# Patient Record
Sex: FEMALE | Race: WHITE | NOT HISPANIC OR LATINO | Employment: FULL TIME | URBAN - METROPOLITAN AREA
[De-identification: names, ages, dates, MRNs, and addresses within clinical notes are randomized per-mention and may not be internally consistent; named-entity substitution may affect disease eponyms.]

---

## 2021-04-26 ENCOUNTER — TRANSFERRED RECORDS (OUTPATIENT)
Dept: HEALTH INFORMATION MANAGEMENT | Facility: CLINIC | Age: 56
End: 2021-04-26

## 2021-05-03 ENCOUNTER — TRANSCRIBE ORDERS (OUTPATIENT)
Dept: OTHER | Age: 56
End: 2021-05-03

## 2021-05-03 DIAGNOSIS — N18.32 STAGE 3B CHRONIC KIDNEY DISEASE (H): Primary | ICD-10-CM

## 2021-06-27 ENCOUNTER — HEALTH MAINTENANCE LETTER (OUTPATIENT)
Age: 56
End: 2021-06-27

## 2021-08-11 NOTE — TELEPHONE ENCOUNTER
RECORDS RECEIVED FROM:    DATE RECEIVED: 09.20.2021   NOTES STATUS DETAILS   OFFICE NOTE from referring provider N/A    OFFICE NOTE from other specialist  Received 09.15.2021 Dr. Mike Varela at [facility]    *Only VASCULITIS or LUPUS gather office notes for the following N/A    *PULMONARY   N/A    *ENT N/A    *DERMATOLOGY N/A    *RHEUMATOLOGY N/A    DISCHARGE SUMMARY from hospital N/A    DISCHARGE REPORT from the ER N/A    MEDICATION LIST Care Everywhere    IMAGING  (NEED IMAGES AND REPORTS)     KIDNEY CT SCAN N/A    KIDNEY ULTRASOUND N/A    MR ABDOMEN N/A    NUCLEAR MEDICINE RENAL N/A    LABS     CBC Care Everywhere 02.13.217   CMP Care Everywhere 02.13.2017   BMP Care Everywhere 02.13.2017   UA Care Everywhere 05.13.2021   URINE PROTEIN Care Everywhere 05.13.2021   RENAL PANEL Care Everywhere 04.27.2021   BIOPSY     KIDNEY BIOPSY  N/A       Action 09.15.2021 RM   Action Taken Called [facility] to get referral and records sent over called [phone] spoke to a rep who states she will be faxing over referral.     Action 09.17.2021 RM   Action Taken Records received, same records are in care everywhere.

## 2021-09-20 ENCOUNTER — PRE VISIT (OUTPATIENT)
Dept: NEPHROLOGY | Facility: CLINIC | Age: 56
End: 2021-09-20

## 2021-09-21 DIAGNOSIS — N18.30 CKD (CHRONIC KIDNEY DISEASE) STAGE 3, GFR 30-59 ML/MIN (H): ICD-10-CM

## 2021-09-21 DIAGNOSIS — R79.89 ELEVATED SERUM CREATININE: Primary | ICD-10-CM

## 2021-09-28 ENCOUNTER — LAB (OUTPATIENT)
Dept: LAB | Facility: CLINIC | Age: 56
End: 2021-09-28
Payer: COMMERCIAL

## 2021-09-28 DIAGNOSIS — N18.30 CKD (CHRONIC KIDNEY DISEASE) STAGE 3, GFR 30-59 ML/MIN (H): ICD-10-CM

## 2021-09-28 DIAGNOSIS — R73.9 HYPERGLYCEMIA: ICD-10-CM

## 2021-09-28 DIAGNOSIS — R79.89 ELEVATED SERUM CREATININE: ICD-10-CM

## 2021-09-28 LAB
ALBUMIN UR-MCNC: NEGATIVE MG/DL
APPEARANCE UR: CLEAR
BILIRUB UR QL STRIP: NEGATIVE
COLOR UR AUTO: YELLOW
ERYTHROCYTE [DISTWIDTH] IN BLOOD BY AUTOMATED COUNT: 12.1 % (ref 10–15)
GLUCOSE UR STRIP-MCNC: NEGATIVE MG/DL
HCT VFR BLD AUTO: 40 % (ref 35–47)
HGB BLD-MCNC: 13.2 G/DL (ref 11.7–15.7)
HGB UR QL STRIP: ABNORMAL
KETONES UR STRIP-MCNC: NEGATIVE MG/DL
LEUKOCYTE ESTERASE UR QL STRIP: ABNORMAL
MCH RBC QN AUTO: 29.7 PG (ref 26.5–33)
MCHC RBC AUTO-ENTMCNC: 33 G/DL (ref 31.5–36.5)
MCV RBC AUTO: 90 FL (ref 78–100)
NITRATE UR QL: NEGATIVE
PH UR STRIP: 6 [PH] (ref 5–7)
PLATELET # BLD AUTO: 253 10E3/UL (ref 150–450)
RBC # BLD AUTO: 4.44 10E6/UL (ref 3.8–5.2)
RBC #/AREA URNS AUTO: ABNORMAL /HPF
SP GR UR STRIP: 1.01 (ref 1–1.03)
SQUAMOUS #/AREA URNS AUTO: ABNORMAL /LPF
UROBILINOGEN UR STRIP-ACNC: 0.2 E.U./DL
WBC # BLD AUTO: 8.9 10E3/UL (ref 4–11)
WBC #/AREA URNS AUTO: ABNORMAL /HPF

## 2021-09-28 PROCEDURE — 83036 HEMOGLOBIN GLYCOSYLATED A1C: CPT

## 2021-09-28 PROCEDURE — 36415 COLL VENOUS BLD VENIPUNCTURE: CPT

## 2021-09-28 PROCEDURE — 84165 PROTEIN E-PHORESIS SERUM: CPT

## 2021-09-28 PROCEDURE — 83883 ASSAY NEPHELOMETRY NOT SPEC: CPT

## 2021-09-28 PROCEDURE — 81001 URINALYSIS AUTO W/SCOPE: CPT

## 2021-09-28 PROCEDURE — 85027 COMPLETE CBC AUTOMATED: CPT

## 2021-09-28 PROCEDURE — 83970 ASSAY OF PARATHORMONE: CPT

## 2021-09-28 PROCEDURE — 80069 RENAL FUNCTION PANEL: CPT

## 2021-09-28 PROCEDURE — 84155 ASSAY OF PROTEIN SERUM: CPT

## 2021-09-28 PROCEDURE — 83550 IRON BINDING TEST: CPT

## 2021-09-28 PROCEDURE — 82728 ASSAY OF FERRITIN: CPT

## 2021-09-28 PROCEDURE — 84156 ASSAY OF PROTEIN URINE: CPT

## 2021-09-28 PROCEDURE — 82306 VITAMIN D 25 HYDROXY: CPT

## 2021-09-28 NOTE — TELEPHONE ENCOUNTER
RECORDS RECEIVED FROM: Care Everywhere   DATE RECEIVED: 09.29.2021   NOTES STATUS DETAILS   OFFICE NOTE from referring provider N/A    OFFICE NOTE from other specialist  Care Everywhere 09.15.2021 Dr. Mike Varela at [facility]    *Only VASCULITIS or LUPUS gather office notes for the following N/A    *PULMONARY   N/A    *ENT N/A    *DERMATOLOGY N/A    *RHEUMATOLOGY N/A    DISCHARGE SUMMARY from hospital N/A    DISCHARGE REPORT from the ER N/A    MEDICATION LIST Care Everywhere    IMAGING  (NEED IMAGES AND REPORTS)     KIDNEY CT SCAN N/A    KIDNEY ULTRASOUND N/A    MR ABDOMEN N/A    NUCLEAR MEDICINE RENAL N/A    LABS     CBC Care Everywhere 02.13.2017   CMP Care Everywhere 02.13.2017   BMP Care Everywhere 02.13.2017   UA Care Everywhere 05.13.2017   URINE PROTEIN Care Everywhere 05.13.2017   RENAL PANEL Care Everywhere 04.27.2021   BIOPSY     KIDNEY BIOPSY  N/A

## 2021-09-29 ENCOUNTER — PRE VISIT (OUTPATIENT)
Dept: NEPHROLOGY | Facility: CLINIC | Age: 56
End: 2021-09-29

## 2021-09-29 ENCOUNTER — OFFICE VISIT (OUTPATIENT)
Dept: NEPHROLOGY | Facility: CLINIC | Age: 56
End: 2021-09-29
Attending: INTERNAL MEDICINE
Payer: COMMERCIAL

## 2021-09-29 VITALS
HEART RATE: 93 BPM | OXYGEN SATURATION: 100 % | DIASTOLIC BLOOD PRESSURE: 82 MMHG | SYSTOLIC BLOOD PRESSURE: 133 MMHG | WEIGHT: 147.6 LBS

## 2021-09-29 DIAGNOSIS — M81.0 AGE-RELATED OSTEOPOROSIS WITHOUT CURRENT PATHOLOGICAL FRACTURE: ICD-10-CM

## 2021-09-29 DIAGNOSIS — R79.89 ELEVATED SERUM CREATININE: ICD-10-CM

## 2021-09-29 DIAGNOSIS — R73.9 HYPERGLYCEMIA: Primary | ICD-10-CM

## 2021-09-29 DIAGNOSIS — N18.32 STAGE 3B CHRONIC KIDNEY DISEASE (H): ICD-10-CM

## 2021-09-29 LAB
ALBUMIN SERPL-MCNC: 3.8 G/DL (ref 3.4–5)
ANION GAP SERPL CALCULATED.3IONS-SCNC: 1 MMOL/L (ref 3–14)
BUN SERPL-MCNC: 22 MG/DL (ref 7–30)
CALCIUM SERPL-MCNC: 9 MG/DL (ref 8.5–10.1)
CHLORIDE BLD-SCNC: 103 MMOL/L (ref 94–109)
CO2 SERPL-SCNC: 31 MMOL/L (ref 20–32)
CREAT SERPL-MCNC: 1.34 MG/DL (ref 0.52–1.04)
CREAT UR-MCNC: 40 MG/DL
DEPRECATED CALCIDIOL+CALCIFEROL SERPL-MC: 97 UG/L (ref 20–75)
FERRITIN SERPL-MCNC: 85 NG/ML (ref 8–252)
GFR SERPL CREATININE-BSD FRML MDRD: 45 ML/MIN/1.73M2
GLUCOSE BLD-MCNC: 191 MG/DL (ref 70–99)
HBA1C MFR BLD: 5.1 % (ref 0–5.6)
IRON SATN MFR SERPL: 22 % (ref 15–46)
IRON SERPL-MCNC: 74 UG/DL (ref 35–180)
PHOSPHATE SERPL-MCNC: 3 MG/DL (ref 2.5–4.5)
POTASSIUM BLD-SCNC: 4.4 MMOL/L (ref 3.4–5.3)
PROT UR-MCNC: <0.05 G/L
PROT/CREAT 24H UR: NORMAL MG/G{CREAT}
PTH-INTACT SERPL-MCNC: 34 PG/ML (ref 18–80)
SODIUM SERPL-SCNC: 135 MMOL/L (ref 133–144)
TIBC SERPL-MCNC: 340 UG/DL (ref 240–430)

## 2021-09-29 PROCEDURE — 99244 OFF/OP CNSLTJ NEW/EST MOD 40: CPT | Mod: 95 | Performed by: INTERNAL MEDICINE

## 2021-09-29 RX ORDER — ESTRADIOL AND NORETHINDRONE ACETATE 1; .5 MG/1; MG/1
1 TABLET ORAL
COMMUNITY
Start: 2021-07-20

## 2021-09-29 NOTE — LETTER
9/29/2021       RE: Whitney Maciel  [address] [place] [facility] [address]     Dear Colleague,    Thank you for referring your patient, Whitney Maciel, to the [facility] NEPHROLOGY CLINIC [place] at [facility]. Please see a copy of my visit note below.    [facility] Nephrology  09/29/2021     Assessment and Plan:    # CKD 3b:  etiology unclear at this point.  Creatinine 1.4 mg/dL with eGFR 42 on 4/27/2021 ([facility] Records)     No anemia  No albuminuria on dipstick  Urine protein pending  Will check renal ultrasound and SPEP/light chain ratio but this is unlikely to be multiple myeloma - lower likelyhood of MGRS  Given low SG on many UA, I suspect tubulointerstitial disease, possibly related to history of PPI +/- NSAID    # Hyperglycenia - check hgb A1C  # osteoporosis - on Calcvium and Vitamin D     Assessment and plan was discussed with patient and she voiced her understanding and agreement.    Tegan He MD  [facility]  Department of Medicine  Division of Renal Disease and Hypertension  [facility]  [employer] Web Console    Return in about 4 months (around 1/29/2022).     Consult:  Whitney Maciel was seen in consultation at the request of Dr. Mike Varela  for CKD management.    Reason for Visit:  Whitney Maciel is a 55 year old female with CKD from unknown cause, who presents for opinion.    HPI:  Whitney Maciel is a 55 year old female with CKD3 based on elevated creatinine with eGFR 45 since Jan 2019.   Had normal creatiine in 2017. No 2018 labs for my review.    24 hour urine calcium 143 Nov 19th 2017. This was done for evaluation of low bone density - osteopenia dx in 2008 then osteoporosis dx 2017. Follows with [facility].    Renal History:   Kidney Disease and Medical Hx:  No HTN, no diabetes, no herbal supplements  Only takes calcium or vitamin D    Was taking more advil - usually advil PM to  sleep - but now quite taking  Took prilosec in the past    No family history of kidney disease  Had one episide of bad pyelo with fevers in 2000  Normal kidney function in 2017 and 2008    ROS:   A comprehensive review of systems was obtained and negative, except as noted in the HPI or PMH.    Active Medical Problems:  CKD 3b  Osteoporosis    PMH:   Medical record was reviewed and PMH was discussed with patient and noted below.  Osteoporosis    PSH:   No past surgical history on file.    Family Hx:   No kidney disease  Personal Hx:   Social History     Tobacco Use     Smoking status: Not on file   Substance Use Topics     Alcohol use: Not on file       Allergies:  Not on File    Medications:  No current outpatient medications on file.     No current facility-administered medications for this visit.      Vitals:  There were no vitals taken for this visit.    Exam:  GENERAL APPEARANCE: alert and no distress  Pulmonary: normal work of breathing  NEURO: mentation intact and speech normal     LABS:   CMP  No lab results found.  No lab results found.  CBC  Recent Labs   Lab Test 09/28/21  1807   HGB 13.2   WBC 8.9   RBC 4.44   HCT 40.0   MCV 90   MCH 29.7   MCHC 33.0   RDW 12.1        URINE STUDIES  Recent Labs   Lab Test 09/28/21  1806   COLOR Yellow   APPEARANCE Clear   URINEGLC Negative   URINEBILI Negative   URINEKETONE Negative   SG 1.010   UBLD Trace*   URINEPH 6.0   PROTEIN Negative   UROBILINOGEN 0.2   NITRITE Negative   LEUKEST Trace*   RBCU 2-5*   WBCU 0-5     No lab results found.  PTH  No lab results found.  IRON STUDIES  No lab results found.      Tegan He MD    Seen via billable video visit. Patient at clinic, I was off campus. Doximity. 12:33pm-1:06 pm      Again, thank you for allowing me to participate in the care of your patient.      Sincerely,    Tegan He MD

## 2021-09-29 NOTE — PATIENT INSTRUCTIONS
As discussed, I will let you know on mychart when I see the results for multiple myeloma, diabetes and kidney ultrasound.    Return in about 4 months (around 1/29/2022).     Tegan He MD  [facility]  Department of Medicine  Division of Renal Disease and Hypertension

## 2021-09-29 NOTE — PROGRESS NOTES
[facility] Nephrology  09/29/2021     Assessment and Plan:    # CKD 3b:  etiology unclear at this point.  Creatinine 1.4 mg/dL with eGFR 42 on 4/27/2021 ([facility] Records)     No anemia  No albuminuria on dipstick  Urine protein pending  Will check renal ultrasound and SPEP/light chain ratio but this is unlikely to be multiple myeloma - lower likelyhood of MGRS  Given low SG on many UA, I suspect tubulointerstitial disease, possibly related to history of PPI +/- NSAID    # Hyperglycenia - check hgb A1C  # osteoporosis - on Calcvium and Vitamin D     Assessment and plan was discussed with patient and she voiced her understanding and agreement.    Tegan He MD  [facility]  Department of Medicine  Division of Renal Disease and Hypertension  [facility]  [email or web address]  [employer] Web Console    Return in about 4 months (around 1/29/2022).     Consult:  Whitney Maciel was seen in consultation at the request of Dr. Mike Varela  for CKD management.    Reason for Visit:  Whitney Maciel is a 55 year old female with CKD from unknown cause, who presents for opinion.    HPI:  Whitney Maciel is a 55 year old female with CKD3 based on elevated creatinine with eGFR 45 since Jan 2019.   Had normal creatiine in 2017. No 2018 labs for my review.    24 hour urine calcium 143 Nov 19th 2017. This was done for evaluation of low bone density - osteopenia dx in 2008 then osteoporosis dx 2017. Follows with Dr. Varela, [facility].    Renal History:   Kidney Disease and Medical Hx:  No HTN, no diabetes, no herbal supplements  Only takes calcium or vitamin D    Was taking more advil - usually advil PM to sleep - but now quite taking  Took prilosec in the past    No family history of kidney disease  Had one episide of bad pyelo with fevers in 2000  Normal kidney function in 2017 and 2008    ROS:   A comprehensive review of systems was obtained and negative, except as noted in the HPI or PMH.    Active Medical  Problems:  CKD 3b  Osteoporosis    PMH:   Medical record was reviewed and PMH was discussed with patient and noted below.  Osteoporosis    PSH:   No past surgical history on file.    Family Hx:   No kidney disease  Personal Hx:   Social History     Tobacco Use     Smoking status: Not on file   Substance Use Topics     Alcohol use: Not on file       Allergies:  Not on File    Medications:  No current outpatient medications on file.     No current facility-administered medications for this visit.      Vitals:  There were no vitals taken for this visit.    Exam:  GENERAL APPEARANCE: alert and no distress  Pulmonary: normal work of breathing  NEURO: mentation intact and speech normal     LABS:   CMP  No lab results found.  No lab results found.  CBC  Recent Labs   Lab Test 09/28/21  1807   HGB 13.2   WBC 8.9   RBC 4.44   HCT 40.0   MCV 90   MCH 29.7   MCHC 33.0   RDW 12.1        URINE STUDIES  Recent Labs   Lab Test 09/28/21  1806   COLOR Yellow   APPEARANCE Clear   URINEGLC Negative   URINEBILI Negative   URINEKETONE Negative   SG 1.010   UBLD Trace*   URINEPH 6.0   PROTEIN Negative   UROBILINOGEN 0.2   NITRITE Negative   LEUKEST Trace*   RBCU 2-5*   WBCU 0-5     No lab results found.  PTH  No lab results found.  IRON STUDIES  No lab results found.      Tegan He MD    Seen via billable video visit. Patient at clinic, I was off campus. Doximity. 12:33pm-1:06 pm

## 2021-09-30 ENCOUNTER — ANCILLARY PROCEDURE (OUTPATIENT)
Dept: ULTRASOUND IMAGING | Facility: CLINIC | Age: 56
End: 2021-09-30
Attending: INTERNAL MEDICINE
Payer: COMMERCIAL

## 2021-09-30 DIAGNOSIS — R79.89 ELEVATED SERUM CREATININE: ICD-10-CM

## 2021-09-30 DIAGNOSIS — R73.9 HYPERGLYCEMIA: ICD-10-CM

## 2021-09-30 LAB
ALBUMIN SERPL ELPH-MCNC: 4.3 G/DL (ref 3.7–5.1)
ALPHA1 GLOB SERPL ELPH-MCNC: 0.3 G/DL (ref 0.2–0.4)
ALPHA2 GLOB SERPL ELPH-MCNC: 0.6 G/DL (ref 0.5–0.9)
B-GLOBULIN SERPL ELPH-MCNC: 0.7 G/DL (ref 0.6–1)
GAMMA GLOB SERPL ELPH-MCNC: 1.2 G/DL (ref 0.7–1.6)
KAPPA LC FREE SER-MCNC: 1.61 MG/DL (ref 0.33–1.94)
KAPPA LC FREE/LAMBDA FREE SER NEPH: 1.55 {RATIO} (ref 0.26–1.65)
LAMBDA LC FREE SERPL-MCNC: 1.04 MG/DL (ref 0.57–2.63)
M PROTEIN SERPL ELPH-MCNC: 0 G/DL
PROT PATTERN SERPL ELPH-IMP: NORMAL
TOTAL PROTEIN SERUM FOR ELP: 7.2 G/DL (ref 6.8–8.8)

## 2021-09-30 PROCEDURE — 76770 US EXAM ABDO BACK WALL COMP: CPT | Performed by: RADIOLOGY

## 2021-10-01 DIAGNOSIS — R93.429 ABNORMAL RENAL ULTRASOUND: Primary | ICD-10-CM

## 2021-10-12 ENCOUNTER — ANCILLARY PROCEDURE (OUTPATIENT)
Dept: MRI IMAGING | Facility: CLINIC | Age: 56
End: 2021-10-12
Attending: INTERNAL MEDICINE
Payer: COMMERCIAL

## 2021-10-12 DIAGNOSIS — R93.429 ABNORMAL RENAL ULTRASOUND: ICD-10-CM

## 2021-10-12 PROCEDURE — 74183 MRI ABD W/O CNTR FLWD CNTR: CPT | Mod: TC | Performed by: RADIOLOGY

## 2021-10-12 PROCEDURE — A9585 GADOBUTROL INJECTION: HCPCS | Performed by: RADIOLOGY

## 2021-10-12 RX ORDER — GADOBUTROL 604.72 MG/ML
7.5 INJECTION INTRAVENOUS ONCE
Status: COMPLETED | OUTPATIENT
Start: 2021-10-12 | End: 2021-10-12

## 2021-10-12 RX ADMIN — GADOBUTROL 6 ML: 604.72 INJECTION INTRAVENOUS at 10:15

## 2021-10-17 ENCOUNTER — HEALTH MAINTENANCE LETTER (OUTPATIENT)
Age: 56
End: 2021-10-17

## 2021-11-01 PROBLEM — M81.0 AGE-RELATED OSTEOPOROSIS WITHOUT CURRENT PATHOLOGICAL FRACTURE: Status: ACTIVE | Noted: 2021-11-01

## 2021-11-01 PROBLEM — N18.32 STAGE 3B CHRONIC KIDNEY DISEASE (H): Status: ACTIVE | Noted: 2021-11-01

## 2022-01-31 DIAGNOSIS — N18.31 STAGE 3A CHRONIC KIDNEY DISEASE (H): Primary | ICD-10-CM

## 2022-02-07 ENCOUNTER — LAB (OUTPATIENT)
Dept: LAB | Facility: CLINIC | Age: 57
End: 2022-02-07
Payer: COMMERCIAL

## 2022-02-07 DIAGNOSIS — N18.31 STAGE 3A CHRONIC KIDNEY DISEASE (H): ICD-10-CM

## 2022-02-07 LAB
ALBUMIN SERPL-MCNC: 4 G/DL (ref 3.4–5)
ANION GAP SERPL CALCULATED.3IONS-SCNC: 4 MMOL/L (ref 3–14)
BUN SERPL-MCNC: 18 MG/DL (ref 7–30)
CALCIUM SERPL-MCNC: 8.7 MG/DL (ref 8.5–10.1)
CHLORIDE BLD-SCNC: 107 MMOL/L (ref 94–109)
CO2 SERPL-SCNC: 27 MMOL/L (ref 20–32)
CREAT SERPL-MCNC: 1.2 MG/DL (ref 0.52–1.04)
CREAT UR-MCNC: 80 MG/DL
GFR SERPL CREATININE-BSD FRML MDRD: 53 ML/MIN/1.73M2
GLUCOSE BLD-MCNC: 87 MG/DL (ref 70–99)
HGB BLD-MCNC: 13.8 G/DL (ref 11.7–15.7)
PHOSPHATE SERPL-MCNC: 2.6 MG/DL (ref 2.5–4.5)
POTASSIUM BLD-SCNC: 4.1 MMOL/L (ref 3.4–5.3)
PROT UR-MCNC: 0.07 G/L
PROT/CREAT 24H UR: 0.09 G/G CR (ref 0–0.2)
SODIUM SERPL-SCNC: 138 MMOL/L (ref 133–144)

## 2022-02-07 PROCEDURE — 36415 COLL VENOUS BLD VENIPUNCTURE: CPT

## 2022-02-07 PROCEDURE — 84156 ASSAY OF PROTEIN URINE: CPT

## 2022-02-07 PROCEDURE — 80069 RENAL FUNCTION PANEL: CPT

## 2022-02-07 PROCEDURE — 85018 HEMOGLOBIN: CPT

## 2022-02-09 ENCOUNTER — OFFICE VISIT (OUTPATIENT)
Dept: NEPHROLOGY | Facility: CLINIC | Age: 57
End: 2022-02-09
Attending: INTERNAL MEDICINE
Payer: COMMERCIAL

## 2022-02-09 VITALS
WEIGHT: 154.9 LBS | HEART RATE: 98 BPM | OXYGEN SATURATION: 99 % | DIASTOLIC BLOOD PRESSURE: 86 MMHG | SYSTOLIC BLOOD PRESSURE: 131 MMHG

## 2022-02-09 DIAGNOSIS — Z51.81 MEDICATION MONITORING ENCOUNTER: ICD-10-CM

## 2022-02-09 DIAGNOSIS — N18.31 CHRONIC KIDNEY DISEASE, STAGE 3A (H): ICD-10-CM

## 2022-02-09 DIAGNOSIS — I15.1 HYPERTENSION SECONDARY TO OTHER RENAL DISORDERS: Primary | ICD-10-CM

## 2022-02-09 PROCEDURE — 99214 OFFICE O/P EST MOD 30 MIN: CPT | Performed by: INTERNAL MEDICINE

## 2022-02-09 RX ORDER — LISINOPRIL 5 MG/1
5 TABLET ORAL AT BEDTIME
Qty: 30 TABLET | Refills: 11 | Status: SHIPPED | OUTPATIENT
Start: 2022-02-09 | End: 2022-10-03

## 2022-02-09 ASSESSMENT — PAIN SCALES - GENERAL: PAINLEVEL: NO PAIN (0)

## 2022-02-09 NOTE — LETTER
2/9/2022       RE: Whitney Maciel  [address]  [facility] [address]     Dear Colleague,    Thank you for referring your patient, Whitney Maciel, to the [facility] NEPHROLOGY CLINIC [place] at [facility]. Please see a copy of my visit note below.    [facility] Nephrology  02/09/2022     Assessment and Plan:    # CKD 3a:  etiology unclear at this point.  Creatinine 1.4 mg/dL with eGFR 42 on 4/27/2021 ([facility] Records)   possibly related to history of preeclampsia, PPI,  NSAID  - had abnormality on ultrasound, follow up MRI showed nothing worrisome (10/12/2021)  - no monoclonal, no rmal light chain ratio    # Hyperglycenia - check hgb A1C  # osteoporosis - on Calcvium and Vitamin D  # high blood pressure with CKD - add lisinopril 5 mg daily, advised on home BP monitoring and check labs in 2 weeks for electrolytes and creatinine     Assessment and plan was discussed with patient and she voiced her understanding and agreement.    32 minutes spent on visit, meeting with Whitney Maciel and in chart review and documentation on date of encounter, 02/09/22     Tegan He MD  [facility]  Department of Medicine  Division of Renal Disease and Hypertension  [facility]  [employer] Web Console    Return in about 6 months (around 8/9/2022).     Reason for Visit:  Whitney Maciel is a 56 year old female with CKD from unknown cause, who presents for opinion.    HPI:  Whitney Maciel is a 56 year old female with CKD3 based on elevated creatinine with eGFR 45 since Jan 2019.   Had normal creatiine in 2017. No 2018 labs for my review.    24 hour urine calcium 143 Nov 19th 2017. This was done for evaluation of low bone density - osteopenia dx in 2008 then osteoporosis dx 2017. Follows with Dr. Tridgell, [facility].    Renal History:   Kidney Disease and Medical Hx:  No HTN, no diabetes, no herbal supplements  Only takes calcium or vitamin  D    Used to take advil at bedtime and had stress fractures in the past and took advil 2-3 per day - last time was 8 years ago  - stopped taking advil pm up until our first visit in 2021  Took prilosec in the past - probably 20 years ago  Did have preeclampsia during first pregnancy. That was 32 years, does not remember if she had protein in her urine. Was treated with magnesium. Second pregnancy was not as bad. No issues with 3rd pregnancy.    No family history of kidney disease  Had one episide of bad pyelo with fevers in 2000  Normal kidney function in 2017 and 2008    ROS:   A comprehensive review of systems was obtained and negative, except as noted in the HPI or PMH.    Active Medical Problems:  CKD 3b  Osteoporosis  History of preeclampsia    PMH:   Medical record was reviewed and PMH was discussed with patient and noted below.  Osteoporosis    PSH:   No past surgical history on file.    Family Hx:   No kidney disease  Personal Hx:   Social History     Tobacco Use     Smoking status: Never Smoker     Smokeless tobacco: Never Used   Substance Use Topics     Alcohol use: Yes       Allergies:  Allergies   Allergen Reactions     Chlorhexidine Rash       Medications:  Current Outpatient Medications   Medication Sig     estradiol-norethindrone (ACTIVELLA) 1-0.5 MG tablet Take 1 tablet by mouth     No current facility-administered medications for this visit.      Vitals:  Wt 70.3 kg (154 lb 14.4 oz)     Exam:  GENERAL APPEARANCE: alert and no distress  EYES: no scleral icterus, pupils equal  HENT: NC/AT  Endocrine: no goiter, no moon facies  Pulmonary: normal work of breathing, no clubbing  CV: WWP   - Edema no  MS: no evidence of inflammation in joints, no muscle tenderness  : no Jeffries  SKIN: no rash, warm, dry, no cyanosis  NEURO: mentation intact and speech normal     LABS:   CMP  Recent Labs   Lab Test 02/07/22  1542 09/28/21  1807    135   POTASSIUM 4.1 4.4   CHLORIDE 107 103   CO2 27 31   ANIONGAP 4 1*    GLC 87 191*   BUN 18 22   CR 1.20* 1.34*   GFRESTIMATED 53* 45*   KENYON 8.7 9.0     No lab results found.  CBC  Recent Labs   Lab Test 02/07/22  1542 09/28/21  1807   HGB 13.8 13.2   WBC  --  8.9   RBC  --  4.44   HCT  --  40.0   MCV  --  90   MCH  --  29.7   MCHC  --  33.0   RDW  --  12.1   PLT  --  253     URINE STUDIES  Recent Labs   Lab Test 09/28/21  1806   COLOR Yellow   APPEARANCE Clear   URINEGLC Negative   URINEBILI Negative   URINEKETONE Negative   SG 1.010   UBLD Trace*   URINEPH 6.0   PROTEIN Negative   UROBILINOGEN 0.2   NITRITE Negative   LEUKEST Trace*   RBCU 2-5*   WBCU 0-5     Recent Labs   Lab Test 02/07/22  1542   UTPG 0.09     PTH  Recent Labs   Lab Test 09/28/21  1807   PTHI 34     IRON STUDIES  Recent Labs   Lab Test 09/28/21  1807   IRON 74      IRONSAT 22   SHANTI 85         Tegan He MD

## 2022-02-09 NOTE — PATIENT INSTRUCTIONS
Start lisinopril 5 mg at bedtime  Check blood pressure at home, check in morning after waking up and before eating.  Om[employer] brand BP cuff is a good one  Blood tests in 2 weeks.  Send [employer] message on BP readings in one month    Return in about 6 months (around 8/9/2022).     Tegan He MD  [facility]  Department of Medicine  Division of Renal Disease and Hypertension  [facility]  [employer] Web Console

## 2022-02-09 NOTE — PROGRESS NOTES
[facility] Nephrology  02/09/2022     Assessment and Plan:    # CKD 3a:  etiology unclear at this point.  Creatinine 1.4 mg/dL with eGFR 42 on 4/27/2021 ([facility] Records)   possibly related to history of preeclampsia, PPI,  NSAID  - had abnormality on ultrasound, follow up MRI showed nothing worrisome (10/12/2021)  - no monoclonal, no rmal light chain ratio    # Hyperglycenia - check hgb A1C  # osteoporosis - on Calcvium and Vitamin D  # high blood pressure with CKD - add lisinopril 5 mg daily, advised on home BP monitoring and check labs in 2 weeks for electrolytes and creatinine     Assessment and plan was discussed with patient and she voiced her understanding and agreement.    32 minutes spent on visit, meeting with Whitney Maciel and in chart review and documentation on date of encounter, 02/09/22     Tegan He MD  [facility]  Department of Medicine  Division of Renal Disease and Hypertension  [facility]  [employer] Web Console    Return in about 6 months (around 8/9/2022).     Reason for Visit:  Whitney Maciel is a 56 year old female with CKD from unknown cause, who presents for opinion.    HPI:  Whitney Maciel is a 56 year old female with CKD3 based on elevated creatinine with eGFR 45 since Jan 2019.   Had normal creatiine in 2017. No 2018 labs for my review.    24 hour urine calcium 143 Nov 19th 2017. This was done for evaluation of low bone density - osteopenia dx in 2008 then osteoporosis dx 2017. Follows with Dr. Varela, [facility].    Renal History:   Kidney Disease and Medical Hx:  No HTN, no diabetes, no herbal supplements  Only takes calcium or vitamin D    Used to take advil at bedtime and had stress fractures in the past and took advil 2-3 per day - last time was 8 years ago  - stopped taking advil pm up until our first visit in 2021  Took prilosec in the past - probably 20 years ago  Did have preeclampsia during first pregnancy. That was 32 years,  does not remember if she had protein in her urine. Was treated with magnesium. Second pregnancy was not as bad. No issues with 3rd pregnancy.    No family history of kidney disease  Had one episide of bad pyelo with fevers in 2000  Normal kidney function in 2017 and 2008    ROS:   A comprehensive review of systems was obtained and negative, except as noted in the HPI or PMH.    Active Medical Problems:  CKD 3b  Osteoporosis  History of preeclampsia    PMH:   Medical record was reviewed and PMH was discussed with patient and noted below.  Osteoporosis    PSH:   No past surgical history on file.    Family Hx:   No kidney disease  Personal Hx:   Social History     Tobacco Use     Smoking status: Never Smoker     Smokeless tobacco: Never Used   Substance Use Topics     Alcohol use: Yes       Allergies:  Allergies   Allergen Reactions     Chlorhexidine Rash       Medications:  Current Outpatient Medications   Medication Sig     estradiol-norethindrone (ACTIVELLA) 1-0.5 MG tablet Take 1 tablet by mouth     No current facility-administered medications for this visit.      Vitals:  Wt 70.3 kg (154 lb 14.4 oz)     Exam:  GENERAL APPEARANCE: alert and no distress  EYES: no scleral icterus, pupils equal  HENT: NC/AT  Endocrine: no goiter, no moon facies  Pulmonary: normal work of breathing, no clubbing  CV: WWP   - Edema no  MS: no evidence of inflammation in joints, no muscle tenderness  : no Jeffries  SKIN: no rash, warm, dry, no cyanosis  NEURO: mentation intact and speech normal     LABS:   CMP  Recent Labs   Lab Test 02/07/22  1542 09/28/21  1807    135   POTASSIUM 4.1 4.4   CHLORIDE 107 103   CO2 27 31   ANIONGAP 4 1*   GLC 87 191*   BUN 18 22   CR 1.20* 1.34*   GFRESTIMATED 53* 45*   KENYON 8.7 9.0     No lab results found.  CBC  Recent Labs   Lab Test 02/07/22  1542 09/28/21  1807   HGB 13.8 13.2   WBC  --  8.9   RBC  --  4.44   HCT  --  40.0   MCV  --  90   MCH  --  29.7   MCHC  --  33.0   RDW  --  12.1   PLT  --   253     URINE STUDIES  Recent Labs   Lab Test 09/28/21  1806   COLOR Yellow   APPEARANCE Clear   URINEGLC Negative   URINEBILI Negative   URINEKETONE Negative   SG 1.010   UBLD Trace*   URINEPH 6.0   PROTEIN Negative   UROBILINOGEN 0.2   NITRITE Negative   LEUKEST Trace*   RBCU 2-5*   WBCU 0-5     Recent Labs   Lab Test 02/07/22  1542   UTPG 0.09     PTH  Recent Labs   Lab Test 09/28/21  1807   PTHI 34     IRON STUDIES  Recent Labs   Lab Test 09/28/21  1807   IRON 74      IRONSAT 22   SHANTI 85         Tegan He MD

## 2022-03-01 ENCOUNTER — LAB (OUTPATIENT)
Dept: LAB | Facility: CLINIC | Age: 57
End: 2022-03-01
Payer: COMMERCIAL

## 2022-03-01 DIAGNOSIS — I15.1 HYPERTENSION SECONDARY TO OTHER RENAL DISORDERS: ICD-10-CM

## 2022-03-01 DIAGNOSIS — N18.31 CHRONIC KIDNEY DISEASE, STAGE 3A (H): ICD-10-CM

## 2022-03-01 DIAGNOSIS — Z51.81 MEDICATION MONITORING ENCOUNTER: ICD-10-CM

## 2022-03-01 LAB
ANION GAP SERPL CALCULATED.3IONS-SCNC: 7 MMOL/L (ref 3–14)
BUN SERPL-MCNC: 16 MG/DL (ref 7–30)
CALCIUM SERPL-MCNC: 9.3 MG/DL (ref 8.5–10.1)
CHLORIDE BLD-SCNC: 102 MMOL/L (ref 94–109)
CO2 SERPL-SCNC: 26 MMOL/L (ref 20–32)
CREAT SERPL-MCNC: 1.25 MG/DL (ref 0.52–1.04)
GFR SERPL CREATININE-BSD FRML MDRD: 50 ML/MIN/1.73M2
GLUCOSE BLD-MCNC: 87 MG/DL (ref 70–99)
POTASSIUM BLD-SCNC: 4.2 MMOL/L (ref 3.4–5.3)
SODIUM SERPL-SCNC: 135 MMOL/L (ref 133–144)

## 2022-03-01 PROCEDURE — 36415 COLL VENOUS BLD VENIPUNCTURE: CPT

## 2022-03-01 PROCEDURE — 80048 BASIC METABOLIC PNL TOTAL CA: CPT

## 2022-03-22 ENCOUNTER — OFFICE VISIT (OUTPATIENT)
Dept: URGENT CARE | Facility: URGENT CARE | Age: 57
End: 2022-03-22
Payer: COMMERCIAL

## 2022-03-22 VITALS
RESPIRATION RATE: 8 BRPM | HEART RATE: 105 BPM | DIASTOLIC BLOOD PRESSURE: 78 MMHG | TEMPERATURE: 97.9 F | SYSTOLIC BLOOD PRESSURE: 112 MMHG | OXYGEN SATURATION: 99 %

## 2022-03-22 DIAGNOSIS — R00.0 TACHYCARDIA: ICD-10-CM

## 2022-03-22 DIAGNOSIS — R42 DIZZINESS: Primary | ICD-10-CM

## 2022-03-22 DIAGNOSIS — R11.0 NAUSEA: ICD-10-CM

## 2022-03-22 PROCEDURE — 99203 OFFICE O/P NEW LOW 30 MIN: CPT | Performed by: NURSE PRACTITIONER

## 2022-03-22 NOTE — PROGRESS NOTES
Assessment & Plan     Dizziness    Nausea    Tachycardia       With severe dizziness, tachycardia, nausea recommend further evaluation in emergency room as cannot rule out stroke which can be life threatening in urgent care. Neurologically stable currently. Differentials include dehydration, stroke, cardiac arrhythmia, vertigo, disembarkment syndrome. Patient agreeable and declines ambulance. She is discharged in stable condition.       Karol Michel NP  [facility]          Yoanna Mariano is a 56 year old female who presents to clinic today with her significant other for the following health issues:  Chief Complaint   Patient presents with     Dizziness     Since Saturday. Was recently on a cruise     Nasal Congestion     Pharyngitis     Patient presents for evaluation of severe dizziness. Symptoms have been present for 3 days. Associated symptoms: nausea, nasal congestion, sore throat, right ear plugged. She feels like she is rocking and has to brace herself to walk. Denies spinning sensation and does not feel like the room is spinning around her. Denies chest pain, shortness of breath, heart palpitations, headache, numbness, tingling. She took dramamine while on board and she had scopalomine patch on. She took dramamine after symptoms started which hasn't helped. She has been drinking fluids and voiding. She has a history of CKD, HTN. She was on a cruise for 7 days and returned 3 days ago.     Problem list, Medication list, Allergies, and Medical history reviewed in EPIC.    ROS:  Review of systems negative except for noted above        Objective    /78   Pulse 105   Temp 97.9  F (36.6  C) (Oral)   Resp 8   SpO2 99%   Physical Exam  Constitutional:       General: She is not in acute distress.     Appearance: She is not toxic-appearing or diaphoretic.   HENT:      Head: Normocephalic and atraumatic.      Right Ear: Tympanic membrane, ear canal and external ear normal.       Left Ear: Tympanic membrane, ear canal and external ear normal.      Nose: Nose normal.      Mouth/Throat:      Mouth: Mucous membranes are dry.      Pharynx: Oropharynx is clear. No oropharyngeal exudate or posterior oropharyngeal erythema.   Eyes:      Extraocular Movements: Extraocular movements intact.      Conjunctiva/sclera: Conjunctivae normal.      Pupils: Pupils are equal, round, and reactive to light.   Cardiovascular:      Rate and Rhythm: Regular rhythm. Tachycardia present.      Heart sounds: Normal heart sounds.   Pulmonary:      Effort: Pulmonary effort is normal. No respiratory distress.      Breath sounds: Normal breath sounds. No wheezing, rhonchi or rales.   Lymphadenopathy:      Cervical: No cervical adenopathy.   Skin:     General: Skin is warm and dry.   Neurological:      General: No focal deficit present.      Mental Status: She is alert and oriented to person, place, and time.      Cranial Nerves: No cranial nerve deficit.      Sensory: No sensory deficit.      Motor: No weakness.      Coordination: Coordination normal.      Gait: Gait abnormal.      Comments: Having to brace self to walk short distances

## 2022-07-24 ENCOUNTER — HEALTH MAINTENANCE LETTER (OUTPATIENT)
Age: 57
End: 2022-07-24

## 2022-09-27 DIAGNOSIS — N18.31 STAGE 3A CHRONIC KIDNEY DISEASE (H): Primary | ICD-10-CM

## 2022-10-03 ENCOUNTER — LAB (OUTPATIENT)
Dept: LAB | Facility: CLINIC | Age: 57
End: 2022-10-03
Attending: INTERNAL MEDICINE
Payer: COMMERCIAL

## 2022-10-03 ENCOUNTER — OFFICE VISIT (OUTPATIENT)
Dept: NEPHROLOGY | Facility: CLINIC | Age: 57
End: 2022-10-03
Attending: INTERNAL MEDICINE
Payer: COMMERCIAL

## 2022-10-03 ENCOUNTER — HEALTH MAINTENANCE LETTER (OUTPATIENT)
Age: 57
End: 2022-10-03

## 2022-10-03 VITALS
OXYGEN SATURATION: 99 % | DIASTOLIC BLOOD PRESSURE: 82 MMHG | WEIGHT: 160.5 LBS | TEMPERATURE: 100 F | HEART RATE: 91 BPM | SYSTOLIC BLOOD PRESSURE: 130 MMHG

## 2022-10-03 DIAGNOSIS — N18.31 STAGE 3A CHRONIC KIDNEY DISEASE (H): ICD-10-CM

## 2022-10-03 DIAGNOSIS — R73.9 HYPERGLYCEMIA: ICD-10-CM

## 2022-10-03 DIAGNOSIS — R73.9 HYPERGLYCEMIA: Primary | ICD-10-CM

## 2022-10-03 DIAGNOSIS — E83.39 HYPOPHOSPHATEMIA: ICD-10-CM

## 2022-10-03 DIAGNOSIS — Z51.81 MEDICATION MONITORING ENCOUNTER: ICD-10-CM

## 2022-10-03 DIAGNOSIS — N18.31 STAGE 3A CHRONIC KIDNEY DISEASE (CKD) (H): ICD-10-CM

## 2022-10-03 LAB
ALBUMIN MFR UR ELPH: <6 MG/DL
ALBUMIN SERPL BCG-MCNC: 4.3 G/DL (ref 3.5–5.2)
ANION GAP SERPL CALCULATED.3IONS-SCNC: 10 MMOL/L (ref 7–15)
BUN SERPL-MCNC: 14.5 MG/DL (ref 6–20)
CALCIUM SERPL-MCNC: 10 MG/DL (ref 8.6–10)
CHLORIDE SERPL-SCNC: 103 MMOL/L (ref 98–107)
CREAT SERPL-MCNC: 1.25 MG/DL (ref 0.51–0.95)
CREAT UR-MCNC: 41 MG/DL
DEPRECATED CALCIDIOL+CALCIFEROL SERPL-MC: 86 UG/L (ref 20–75)
DEPRECATED HCO3 PLAS-SCNC: 28 MMOL/L (ref 22–29)
GFR SERPL CREATININE-BSD FRML MDRD: 50 ML/MIN/1.73M2
GLUCOSE SERPL-MCNC: 132 MG/DL (ref 70–99)
HBA1C MFR BLD: 5.2 %
HGB BLD-MCNC: 13.4 G/DL (ref 11.7–15.7)
PHOSPHATE SERPL-MCNC: 2 MG/DL (ref 2.5–4.5)
POTASSIUM SERPL-SCNC: 4.4 MMOL/L (ref 3.4–5.3)
PROT/CREAT 24H UR: NORMAL MG/G{CREAT}
PTH-INTACT SERPL-MCNC: 27 PG/ML (ref 15–65)
SODIUM SERPL-SCNC: 141 MMOL/L (ref 136–145)

## 2022-10-03 PROCEDURE — G0463 HOSPITAL OUTPT CLINIC VISIT: HCPCS

## 2022-10-03 PROCEDURE — 85018 HEMOGLOBIN: CPT | Performed by: PATHOLOGY

## 2022-10-03 PROCEDURE — 36415 COLL VENOUS BLD VENIPUNCTURE: CPT | Performed by: PATHOLOGY

## 2022-10-03 PROCEDURE — 84156 ASSAY OF PROTEIN URINE: CPT | Performed by: PATHOLOGY

## 2022-10-03 PROCEDURE — 80069 RENAL FUNCTION PANEL: CPT | Performed by: PATHOLOGY

## 2022-10-03 PROCEDURE — 83970 ASSAY OF PARATHORMONE: CPT | Performed by: INTERNAL MEDICINE

## 2022-10-03 PROCEDURE — 82306 VITAMIN D 25 HYDROXY: CPT | Performed by: INTERNAL MEDICINE

## 2022-10-03 PROCEDURE — 99214 OFFICE O/P EST MOD 30 MIN: CPT | Performed by: INTERNAL MEDICINE

## 2022-10-03 PROCEDURE — 83036 HEMOGLOBIN GLYCOSYLATED A1C: CPT | Performed by: INTERNAL MEDICINE

## 2022-10-03 PROCEDURE — 83970 ASSAY OF PARATHORMONE: CPT | Performed by: PATHOLOGY

## 2022-10-03 RX ORDER — LOSARTAN POTASSIUM 25 MG/1
12.5 TABLET ORAL AT BEDTIME
Qty: 30 TABLET | Refills: 11 | Status: SHIPPED | OUTPATIENT
Start: 2022-10-03 | End: 2022-11-05

## 2022-10-03 ASSESSMENT — PAIN SCALES - GENERAL: PAINLEVEL: NO PAIN (0)

## 2022-10-03 NOTE — PATIENT INSTRUCTIONS
Dear Whitney Maciel    Start losartan 12.5 mg at bedtime  Labs mid October in [place]  Let us know if you have any problems after starting losartan    Yanet Blackwell LPN care coordinator - [phone]    Return in about 6 months (around 4/3/2023).       Take care!  Tegan He MD  Department of Medicine  Division of Renal Diseases and Hypertension  [facility]

## 2022-10-03 NOTE — LETTER
10/3/2022       RE: Whitney Maciel  [address] [facility] [address]     Dear Colleague,    Thank you for referring your patient, Whitney Maciel, to the [facility] NEPHROLOGY CLINIC [facility]. Please see a copy of my visit note below.    [facility] Nephrology  10/03/2022     Assessment and Plan:    # CKD 3a:  etiology unclear at this point.  Creatinine 1.25 mg/dL with eGFR 50  possibly related to history of preeclampsia, PPI,  NSAID  - had abnormality on ultrasound, follow up MRI showed nothing worrisome (10/12/2021)  - no monoclonal, no rmal light chain ratio  - had severe cough with lisinopril  - will add losartan 12.5 mg at bedtime - she does not have high blood pressures at home    # Hyperglycenia - check hgb A1C  # osteoporosis - on estradiol, Calcium and Vitamin D - note that serum calcium 10 - borderline, with PTH 20's. Vitamin D level pending.  # hypophosphatemia - with borderline high calcium - will order PTH to add to blood in lab, consider reviewing calcium and Vitamin D supplement   - this is new, previous phosphorus levels were normal in Feb 2022 and Sept 2021    # hyperglycemia - will check hemoglobin A1C    # HCM: s/p latest booster and flu shot for fall 2022  - discussed that if she has COVID again to let me know, she would be a candidate for paxlovid     Assessment and plan was discussed with patient and she voiced her understanding and agreement.      Tegan He MD  [facility]   Department of Medicine  Division of Renal Disease and Hypertension  [facility]  [employer] Web Console    Return in about 6 months (around 4/3/2023).     Reason for Visit:  Whitney Maciel is a 56 year old female with CKD from unknown cause, who presents for opinion.    HPI:  Whitney Maciel is a 56 year old female with CKD3 based on elevated creatinine with eGFR 45 since Jan 2019.   Had normal creatiine in 2017. No 2018 labs for my  review.    24 hour urine calcium 143 Nov 19th 2017. This was done for evaluation of low bone density - osteopenia dx in 2008 then osteoporosis dx 2017. [facility].    Renal History:   Kidney Disease and Medical Hx:  No HTN, no diabetes, no herbal supplements  Only takes calcium or vitamin D    Used to take advil at bedtime and had stress fractures in the past and took advil 2-3 per day - last time was 8 years ago  - stopped taking advil pm up until our first visit in 2021  Took prilosec in the past - probably 20 years ago  Did have preeclampsia during first pregnancy. That was 32 years, does not remember if she had protein in her urine. Was treated with magnesium. Second pregnancy was not as bad. No issues with 3rd pregnancy.    No family history of kidney disease  Had one episide of bad pyelo with fevers in 2000  Normal kidney function in 2017 and 2008    Last visit 2/9/2022  Started low dose lisinopril - had significant cough so discontinued.  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  Went to [place] and got COVID in July - did not seek medical attention.    No hematuria    ROS:   A comprehensive review of systems was obtained and negative, except as noted in the HPI or PMH.    Active Medical Problems:  CKD 3b  Osteoporosis  History of preeclampsia    PMH:   Medical record was reviewed and PMH was discussed with patient and noted below.  Osteoporosis    PSH:   No past surgical history on file.    Family Hx:   No kidney disease  Personal Hx:   Social History     Tobacco Use     Smoking status: Never Smoker     Smokeless tobacco: Never Used   Substance Use Topics     Alcohol use: Yes       Allergies:  Allergies   Allergen Reactions     Chlorhexidine Rash       Medications:  Current Outpatient Medications    Medication Sig     estradiol-norethindrone (ACTIVELLA) 1-0.5 MG tablet Take 1 tablet by mouth      No current facility-administered medications for this visit.      Vitals:  /82   Pulse 91   Temp 100  F (37.8  C)   Wt 72.8 kg (160 lb 8 oz)   SpO2 99%     Exam:  GENERAL APPEARANCE: alert and no distress  EYES: no scleral icterus, pupils equal  HENT: NC/AT  Endocrine: no goiter, no moon facies  Pulmonary: normal work of breathing, no clubbing  CV: WWP   - Edema no  MS: no evidence of inflammation in joints, no muscle tenderness  : no Jeffries  SKIN: no rash, warm, dry, no cyanosis  NEURO: mentation intact and speech normal     LABS:   CMP  Recent Labs   Lab Test 10/03/22  1343 03/01/22  1552 02/07/22  1542 09/28/21  1807    135 138 135   POTASSIUM 4.4 4.2 4.1 4.4   CHLORIDE 103 102 107 103   CO2 28 26 27 31   ANIONGAP 10 7 4 1*   * 87 87 191*   BUN 14.5 16 18 22   CR 1.25* 1.25* 1.20* 1.34*   GFRESTIMATED 50* 50* 53* 45*   KENYON 10.0 9.3 8.7 9.0     No lab results found.  CBC  Recent Labs   Lab Test 10/03/22  1343 02/07/22  1542 09/28/21  1807   HGB 13.4 13.8 13.2   WBC  --   --  8.9   RBC  --   --  4.44   HCT  --   --  40.0   MCV  --   --  90   MCH  --   --  29.7   MCHC  --   --  33.0   RDW  --   --  12.1   PLT  --   --  253     URINE STUDIES  Recent Labs   Lab Test 09/28/21  1806   COLOR Yellow   APPEARANCE Clear   URINEGLC Negative   URINEBILI Negative   URINEKETONE Negative   SG 1.010   UBLD Trace*   URINEPH 6.0   PROTEIN Negative   UROBILINOGEN 0.2   NITRITE Negative   LEUKEST Trace*   RBCU 2-5*   WBCU 0-5     Recent Labs   Lab Test 02/07/22  1542   UTPG 0.09     PTH  Recent Labs   Lab Test 10/03/22  1343 09/28/21  1807   PTHI 27 34     IRON STUDIES  Recent Labs   Lab Test 09/28/21  1807   IRON 74      IRONSAT 22   SHANTI 85         Tegan He MD

## 2022-10-03 NOTE — PROGRESS NOTES
[facility] Nephrology  10/03/2022     Assessment and Plan:    # CKD 3a:  etiology unclear at this point.  Creatinine 1.25 mg/dL with eGFR 50  possibly related to history of preeclampsia, PPI,  NSAID  - had abnormality on ultrasound, follow up MRI showed nothing worrisome (10/12/2021)  - no monoclonal, no rmal light chain ratio  - had severe cough with lisinopril  - will add losartan 12.5 mg at bedtime - she does not have high blood pressures at home    # Hyperglycenia - check hgb A1C  # osteoporosis - on estradiol, Calcium and Vitamin D - note that serum calcium 10 - borderline, with PTH 20's. Vitamin D level pending.  # hypophosphatemia - with borderline high calcium - will order PTH to add to blood in lab, consider reviewing calcium and Vitamin D supplement   - this is new, previous phosphorus levels were normal in Feb 2022 and Sept 2021    # hyperglycemia - will check hemoglobin A1C    # HCM: s/p latest booster and flu shot for fall 2022  - discussed that if she has COVID again to let me know, she would be a candidate for paxlovid     Assessment and plan was discussed with patient and she voiced her understanding and agreement.      Tegan He MD  [facility]   Department of Medicine  Division of Renal Disease and Hypertension  [facility]  [employer] Web Console    Return in about 6 months (around 4/3/2023).     Reason for Visit:  Whitney Maciel is a 56 year old female with CKD from unknown cause, who presents for opinion.    HPI:  Whitney Maciel is a 56 year old female with CKD3 based on elevated creatinine with eGFR 45 since Jan 2019.   Had normal creatiine in 2017. No 2018 labs for my review.    24 hour urine calcium 143 Nov 19th 2017. This was done for evaluation of low bone density - osteopenia dx in 2008 then osteoporosis dx 2017. [facility].    Renal History:   Kidney Disease and Medical Hx:  No HTN, no diabetes, no herbal supplements  Only  takes calcium or vitamin D    Used to take advil at bedtime and had stress fractures in the past and took advil 2-3 per day - last time was 8 years ago  - stopped taking advil pm up until our first visit in 2021  Took prilosec in the past - probably 20 years ago  Did have preeclampsia during first pregnancy. That was 32 years, does not remember if she had protein in her urine. Was treated with magnesium. Second pregnancy was not as bad. No issues with 3rd pregnancy.    No family history of kidney disease  Had one episide of bad pyelo with fevers in 2000  Normal kidney function in 2017 and 2008    Last visit 2/9/2022  Started low dose lisinopril - had significant cough so discontinued.  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  Went to [place] and got COVID in July - did not seek medical attention.    No hematuria    ROS:   A comprehensive review of systems was obtained and negative, except as noted in the HPI or PMH.    Active Medical Problems:  CKD 3b  Osteoporosis  History of preeclampsia    PMH:   Medical record was reviewed and PMH was discussed with patient and noted below.  Osteoporosis    PSH:   No past surgical history on file.    Family Hx:   No kidney disease  Personal Hx:   Social History     Tobacco Use     Smoking status: Never Smoker     Smokeless tobacco: Never Used   Substance Use Topics     Alcohol use: Yes       Allergies:  Allergies   Allergen Reactions     Chlorhexidine Rash       Medications:  Current Outpatient Medications   Medication Sig     estradiol-norethindrone (ACTIVELLA) 1-0.5 MG tablet Take 1 tablet by mouth      No current facility-administered medications for this visit.      Vitals:  /82   Pulse 91   Temp 100  F (37.8  C)   Wt 72.8 kg (160 lb 8 oz)   SpO2 99%     Exam:  GENERAL APPEARANCE: alert and no  distress  EYES: no scleral icterus, pupils equal  HENT: NC/AT  Endocrine: no goiter, no moon facies  Pulmonary: normal work of breathing, no clubbing  CV: WWP   - Edema no  MS: no evidence of inflammation in joints, no muscle tenderness  : no Jeffries  SKIN: no rash, warm, dry, no cyanosis  NEURO: mentation intact and speech normal     LABS:   CMP  Recent Labs   Lab Test 10/03/22  1343 03/01/22  1552 02/07/22  1542 09/28/21  1807    135 138 135   POTASSIUM 4.4 4.2 4.1 4.4   CHLORIDE 103 102 107 103   CO2 28 26 27 31   ANIONGAP 10 7 4 1*   * 87 87 191*   BUN 14.5 16 18 22   CR 1.25* 1.25* 1.20* 1.34*   GFRESTIMATED 50* 50* 53* 45*   KENYON 10.0 9.3 8.7 9.0     No lab results found.  CBC  Recent Labs   Lab Test 10/03/22  1343 02/07/22  1542 09/28/21  1807   HGB 13.4 13.8 13.2   WBC  --   --  8.9   RBC  --   --  4.44   HCT  --   --  40.0   MCV  --   --  90   MCH  --   --  29.7   MCHC  --   --  33.0   RDW  --   --  12.1   PLT  --   --  253     URINE STUDIES  Recent Labs   Lab Test 09/28/21  1806   COLOR Yellow   APPEARANCE Clear   URINEGLC Negative   URINEBILI Negative   URINEKETONE Negative   SG 1.010   UBLD Trace*   URINEPH 6.0   PROTEIN Negative   UROBILINOGEN 0.2   NITRITE Negative   LEUKEST Trace*   RBCU 2-5*   WBCU 0-5     Recent Labs   Lab Test 02/07/22  1542   UTPG 0.09     PTH  Recent Labs   Lab Test 10/03/22  1343 09/28/21  1807   PTHI 27 34     IRON STUDIES  Recent Labs   Lab Test 09/28/21  1807   IRON 74      IRONSAT 22   SHANTI 85         Tegan He MD

## 2022-10-03 NOTE — NURSING NOTE
Chief Complaint   Patient presents with     RECHECK     Return visit.     Blood pressure 130/82, pulse 91, temperature 100  F (37.8  C), weight 72.8 kg (160 lb 8 oz), SpO2 99 %.    MAX CHAUDHRY

## 2022-10-04 LAB — PTH-INTACT SERPL-MCNC: 21 PG/ML (ref 15–65)

## 2022-10-18 ENCOUNTER — LAB (OUTPATIENT)
Dept: LAB | Facility: CLINIC | Age: 57
End: 2022-10-18
Payer: COMMERCIAL

## 2022-10-18 DIAGNOSIS — Z51.81 MEDICATION MONITORING ENCOUNTER: ICD-10-CM

## 2022-10-18 DIAGNOSIS — E83.39 HYPOPHOSPHATEMIA: ICD-10-CM

## 2022-10-18 PROCEDURE — 80069 RENAL FUNCTION PANEL: CPT

## 2022-10-18 PROCEDURE — 36415 COLL VENOUS BLD VENIPUNCTURE: CPT

## 2022-10-19 LAB
ALBUMIN SERPL-MCNC: 3.8 G/DL (ref 3.4–5)
ANION GAP SERPL CALCULATED.3IONS-SCNC: 3 MMOL/L (ref 3–14)
BUN SERPL-MCNC: 21 MG/DL (ref 7–30)
CALCIUM SERPL-MCNC: 8.9 MG/DL (ref 8.5–10.1)
CHLORIDE BLD-SCNC: 105 MMOL/L (ref 94–109)
CO2 SERPL-SCNC: 30 MMOL/L (ref 20–32)
CREAT SERPL-MCNC: 1.27 MG/DL (ref 0.52–1.04)
GFR SERPL CREATININE-BSD FRML MDRD: 49 ML/MIN/1.73M2
GLUCOSE BLD-MCNC: 87 MG/DL (ref 70–99)
PHOSPHATE SERPL-MCNC: 2.8 MG/DL (ref 2.5–4.5)
POTASSIUM BLD-SCNC: 4.2 MMOL/L (ref 3.4–5.3)
SODIUM SERPL-SCNC: 138 MMOL/L (ref 133–144)

## 2023-04-10 ENCOUNTER — DOCUMENTATION ONLY (OUTPATIENT)
Dept: LAB | Facility: CLINIC | Age: 58
End: 2023-04-10
Payer: COMMERCIAL

## 2023-04-10 DIAGNOSIS — N18.31 STAGE 3A CHRONIC KIDNEY DISEASE (H): Primary | ICD-10-CM

## 2023-04-12 ENCOUNTER — LAB (OUTPATIENT)
Dept: LAB | Facility: CLINIC | Age: 58
End: 2023-04-12
Payer: COMMERCIAL

## 2023-04-12 ENCOUNTER — OFFICE VISIT (OUTPATIENT)
Dept: NEPHROLOGY | Facility: CLINIC | Age: 58
End: 2023-04-12
Attending: INTERNAL MEDICINE
Payer: COMMERCIAL

## 2023-04-12 VITALS
HEART RATE: 86 BPM | RESPIRATION RATE: 16 BRPM | HEIGHT: 68 IN | DIASTOLIC BLOOD PRESSURE: 74 MMHG | OXYGEN SATURATION: 98 % | BODY MASS INDEX: 24.86 KG/M2 | WEIGHT: 164 LBS | SYSTOLIC BLOOD PRESSURE: 111 MMHG

## 2023-04-12 DIAGNOSIS — N18.31 STAGE 3A CHRONIC KIDNEY DISEASE (H): ICD-10-CM

## 2023-04-12 DIAGNOSIS — M81.0 AGE-RELATED OSTEOPOROSIS WITHOUT CURRENT PATHOLOGICAL FRACTURE: ICD-10-CM

## 2023-04-12 DIAGNOSIS — N18.31 STAGE 3A CHRONIC KIDNEY DISEASE (CKD) (H): Primary | ICD-10-CM

## 2023-04-12 DIAGNOSIS — R73.9 HYPERGLYCEMIA: ICD-10-CM

## 2023-04-12 LAB
ALBUMIN MFR UR ELPH: <6 MG/DL (ref 1–14)
ALBUMIN SERPL BCG-MCNC: 4.3 G/DL (ref 3.5–5.2)
ANION GAP SERPL CALCULATED.3IONS-SCNC: 10 MMOL/L (ref 7–15)
BUN SERPL-MCNC: 16.9 MG/DL (ref 6–20)
CALCIUM SERPL-MCNC: 9.7 MG/DL (ref 8.6–10)
CHLORIDE SERPL-SCNC: 103 MMOL/L (ref 98–107)
CREAT SERPL-MCNC: 1.3 MG/DL (ref 0.51–0.95)
CREAT UR-MCNC: 83.5 MG/DL
DEPRECATED HCO3 PLAS-SCNC: 27 MMOL/L (ref 22–29)
GFR SERPL CREATININE-BSD FRML MDRD: 48 ML/MIN/1.73M2
GLUCOSE SERPL-MCNC: 131 MG/DL (ref 70–99)
HGB BLD-MCNC: 13.7 G/DL (ref 11.7–15.7)
PHOSPHATE SERPL-MCNC: 2.6 MG/DL (ref 2.5–4.5)
POTASSIUM SERPL-SCNC: 4.9 MMOL/L (ref 3.4–5.3)
PROT/CREAT 24H UR: NORMAL MG/G{CREAT}
SODIUM SERPL-SCNC: 140 MMOL/L (ref 136–145)

## 2023-04-12 PROCEDURE — 36415 COLL VENOUS BLD VENIPUNCTURE: CPT | Performed by: PATHOLOGY

## 2023-04-12 PROCEDURE — 99213 OFFICE O/P EST LOW 20 MIN: CPT | Performed by: INTERNAL MEDICINE

## 2023-04-12 PROCEDURE — 85018 HEMOGLOBIN: CPT | Performed by: PATHOLOGY

## 2023-04-12 PROCEDURE — 80069 RENAL FUNCTION PANEL: CPT | Performed by: PATHOLOGY

## 2023-04-12 PROCEDURE — 84156 ASSAY OF PROTEIN URINE: CPT | Performed by: PATHOLOGY

## 2023-04-12 PROCEDURE — G0463 HOSPITAL OUTPT CLINIC VISIT: HCPCS | Performed by: INTERNAL MEDICINE

## 2023-04-12 ASSESSMENT — PAIN SCALES - GENERAL: PAINLEVEL: NO PAIN (0)

## 2023-04-12 NOTE — NURSING NOTE
"Chief Complaint   Patient presents with     RECHECK     CKD     Vital signs:      BP: 111/74 Pulse: 86   Resp: 16 SpO2: 98 %     Height: 172.7 cm (5' 8\") (per pt) Weight: 74.4 kg (164 lb)  Estimated body mass index is 24.94 kg/m  as calculated from the following:    Height as of this encounter: 1.727 m (5' 8\").    Weight as of this encounter: 74.4 kg (164 lb).        Lidia Roman, [facility]  4/12/2023 1:45 PM      "

## 2023-04-12 NOTE — LETTER
4/12/2023       RE: Whitney Maciel  [address] [facility] [address]     Dear Colleague,    Thank you for referring your patient, Whitney Maciel, to the [facility] NEPHROLOGY CLINIC [place] [facility]. Please see a copy of my visit note below.    [facility]phrology  04/12/2023     Assessment and Plan:    # CKD 3a:  etiology unclear at this point.  Creatinine 1.3 mg/dL with eGFR 48 - may be slight hemodynamic variation related to ARB  possibly related to history of preeclampsia, PPI,  NSAID  - had abnormality on ultrasound, follow up MRI showed nothing worrisome (10/12/2021)  - no monoclonal, normal light chain ratio  - had severe cough with lisinopril  - losartan 12.5 mg at bedtime for renal protection - she does not have high blood pressures at home    # osteoporosis - dx 2017, on estradiol, Calcium and Vitamin D - In Oct 2022, serum calcium was 10 with PTH 20's and vitamin D level 86, advised discontinuation of vitamin D and she remains off vitamin D  - Dr. Varela is advising DXA in May 2023 ([facility] Note reviewed) and [facility] has this scheduled in June.   # hypophosphatemia - improved/resolved on recheck 10/18/2022 as well as today's labs    # hyperglycemia - A1C was <6 Oct 2022, today's blood glucose was not fasting       Assessment and plan was discussed with patient and she voiced her understanding and agreement.      Tegan He MD  [facility]   Department of Medicine  Division of Renal Disease and Hypertension  [facility]  [employer] Web Console    Return in about 1 year (around 4/12/2024).     Reason for Visit:  Whitney Maciel is a 57 year old with CKD from unknown cause, who presents for opinion.    HPI:  Whitney Maciel is a 57 year old  with CKD3 based on elevated creatinine with eGFR 45 since Jan 2019.   Had normal creatinine in 2017.     Renal History:   Kidney Disease and Medical Hx:  No HTN, no diabetes, no herbal  "supplements  Only takes calcium or vitamin D    Used to take advil at bedtime and had stress fractures in the past and took advil 2-3 per day - last time was 8 years ago  - stopped taking advil pm up until our first visit in 2021  Took prilosec in the past - probably 20 years ago  Did have preeclampsia during first pregnancy. That was 32 years, does not remember if she had protein in her urine. Was treated with magnesium. Second pregnancy was not as bad. No issues with 3rd pregnancy.    Had one episide of bad pyelo with fevers in 2000  Normal kidney function in 2017 and 2008      Last visit Oct 2022. Started losartan and has not had cough.  Her calcium level was high end of range with Vitamin D level 80's and PTH 20's so advised discontinuation of Vitamin D.  Has been off vitamin D.  Feeling good. No fever or chills, no N/V, no CP, no dyspnea, no leg edema.   No new health issues.    ROS:   A comprehensive review of systems was obtained and negative, except as noted in the HPI or PMH.    Active Medical Problems:  CKD 3b  Osteoporosis  History of preeclampsia    PMH:   Medical record was reviewed and PMH was discussed with patient and noted below.  Osteoporosis    PSH:   No past surgical history on file.    Family Hx:   No kidney disease  Personal Hx:   Social History     Tobacco Use    Smoking status: Never    Smokeless tobacco: Never   Vaping Use    Vaping status: Not on file   Substance Use Topics    Alcohol use: Yes       Allergies:  Allergies   Allergen Reactions    Lisinopril Cough    Chlorhexidine Rash       Medications:  Current Outpatient Medications   Medication Sig    estradiol-norethindrone (ACTIVELLA) 1-0.5 MG tablet Take 1 tablet by mouth     losartan (COZAAR) 25 MG tablet Take 1 tablet (25 mg) by mouth At Bedtime     No current facility-administered medications for this visit.      Vitals:  /74   Pulse 86   Resp 16   Ht 1.727 m (5' 8\")   Wt 74.4 kg (164 lb)   SpO2 98%   BMI 24.94 kg/m  "   Exam:  GENERAL APPEARANCE: alert and no distress  EYES: no scleral icterus, pupils equal  Pulmonary: normal work of breathing, no clubbing  CV: WWP   - Edema none  NEURO: mentation intact and speech normal     LABS:   CMP  Recent Labs   Lab Test 10/18/22  1512 10/03/22  1343 03/01/22  1552 02/07/22  1542    141 135 138   POTASSIUM 4.2 4.4 4.2 4.1   CHLORIDE 105 103 102 107   CO2 30 28 26 27   ANIONGAP 3 10 7 4   GLC 87 132* 87 87   BUN 21 14.5 16 18   CR 1.27* 1.25* 1.25* 1.20*   GFRESTIMATED 49* 50* 50* 53*   KENYON 8.9 10.0 9.3 8.7     No lab results found.  CBC  Recent Labs   Lab Test 10/03/22  1343 02/07/22  1542 09/28/21  1807   HGB 13.4 13.8 13.2   WBC  --   --  8.9   RBC  --   --  4.44   HCT  --   --  40.0   MCV  --   --  90   MCH  --   --  29.7   MCHC  --   --  33.0   RDW  --   --  12.1   PLT  --   --  253     URINE STUDIES  Recent Labs   Lab Test 09/28/21  1806   COLOR Yellow   APPEARANCE Clear   URINEGLC Negative   URINEBILI Negative   URINEKETONE Negative   SG 1.010   UBLD Trace*   URINEPH 6.0   PROTEIN Negative   UROBILINOGEN 0.2   NITRITE Negative   LEUKEST Trace*   RBCU 2-5*   WBCU 0-5     Recent Labs   Lab Test 02/07/22  1542   UTPG 0.09     PTH  Recent Labs   Lab Test 10/03/22  1343 09/28/21  1807   PTHI 21  27 34     IRON STUDIES  Recent Labs   Lab Test 09/28/21  1807   IRON 74      IRONSAT 22   SHANTI 85         Tegan He MD

## 2023-04-12 NOTE — PROGRESS NOTES
[facility] Nephrology  04/12/2023     Assessment and Plan:    # CKD 3a:  etiology unclear at this point.  Creatinine 1.3 mg/dL with eGFR 48 - may be slight hemodynamic variation related to ARB  possibly related to history of preeclampsia, PPI,  NSAID  - had abnormality on ultrasound, follow up MRI showed nothing worrisome (10/12/2021)  - no monoclonal, normal light chain ratio  - had severe cough with lisinopril  - losartan 12.5 mg at bedtime for renal protection - she does not have high blood pressures at home    # osteoporosis - dx 2017, on estradiol, Calcium and Vitamin D - In Oct 2022, serum calcium was 10 with PTH 20's and vitamin D level 86, advised discontinuation of vitamin D and she remains off vitamin D  - Dr. Varela is advising DXA in May 2023 ([facility] Note reviewed) and [facility] has this scheduled in June.   # hypophosphatemia - improved/resolved on recheck 10/18/2022 as well as today's labs    # hyperglycemia - A1C was <6 Oct 2022, today's blood glucose was not fasting       Assessment and plan was discussed with patient and she voiced her understanding and agreement.      Tegan He MD  [facility]   Department of Medicine  Division of Renal Disease and Hypertension  [facility]  [employer] Web Console    Return in about 1 year (around 4/12/2024).     Reason for Visit:  Whitney Maciel is a 57 year old with CKD from unknown cause, who presents for opinion.    HPI:  Whitney Maciel is a 57 year old  with CKD3 based on elevated creatinine with eGFR 45 since Jan 2019.   Had normal creatinine in 2017.     Renal History:   Kidney Disease and Medical Hx:  No HTN, no diabetes, no herbal supplements  Only takes calcium or vitamin D    Used to take advil at bedtime and had stress fractures in the past and took advil 2-3 per day - last time was 8 years ago  - stopped taking advil pm up until our first visit in 2021  Took prilosec in the past - probably 20 years ago  Did have preeclampsia  "during first pregnancy. That was 32 years, does not remember if she had protein in her urine. Was treated with magnesium. Second pregnancy was not as bad. No issues with 3rd pregnancy.    Had one episide of bad pyelo with fevers in 2000  Normal kidney function in 2017 and 2008      Last visit Oct 2022. Started losartan and has not had cough.  Her calcium level was high end of range with Vitamin D level 80's and PTH 20's so advised discontinuation of Vitamin D.  Has been off vitamin D.  Feeling good. No fever or chills, no N/V, no CP, no dyspnea, no leg edema.   No new health issues.    ROS:   A comprehensive review of systems was obtained and negative, except as noted in the HPI or PMH.    Active Medical Problems:  CKD 3b  Osteoporosis  History of preeclampsia    PMH:   Medical record was reviewed and PMH was discussed with patient and noted below.  Osteoporosis    PSH:   No past surgical history on file.    Family Hx:   No kidney disease  Personal Hx:   Social History     Tobacco Use     Smoking status: Never     Smokeless tobacco: Never   Vaping Use     Vaping status: Not on file   Substance Use Topics     Alcohol use: Yes       Allergies:  Allergies   Allergen Reactions     Lisinopril Cough     Chlorhexidine Rash       Medications:  Current Outpatient Medications   Medication Sig     estradiol-norethindrone (ACTIVELLA) 1-0.5 MG tablet Take 1 tablet by mouth      losartan (COZAAR) 25 MG tablet Take 1 tablet (25 mg) by mouth At Bedtime     No current facility-administered medications for this visit.      Vitals:  /74   Pulse 86   Resp 16   Ht 1.727 m (5' 8\")   Wt 74.4 kg (164 lb)   SpO2 98%   BMI 24.94 kg/m    Exam:  GENERAL APPEARANCE: alert and no distress  EYES: no scleral icterus, pupils equal  Pulmonary: normal work of breathing, no clubbing  CV: WWP   - Edema none  NEURO: mentation intact and speech normal     LABS:   CMP  Recent Labs   Lab Test 10/18/22  1512 10/03/22  1343 03/01/22  1552 " 02/07/22  1542    141 135 138   POTASSIUM 4.2 4.4 4.2 4.1   CHLORIDE 105 103 102 107   CO2 30 28 26 27   ANIONGAP 3 10 7 4   GLC 87 132* 87 87   BUN 21 14.5 16 18   CR 1.27* 1.25* 1.25* 1.20*   GFRESTIMATED 49* 50* 50* 53*   KENYON 8.9 10.0 9.3 8.7     No lab results found.  CBC  Recent Labs   Lab Test 10/03/22  1343 02/07/22  1542 09/28/21  1807   HGB 13.4 13.8 13.2   WBC  --   --  8.9   RBC  --   --  4.44   HCT  --   --  40.0   MCV  --   --  90   MCH  --   --  29.7   MCHC  --   --  33.0   RDW  --   --  12.1   PLT  --   --  253     URINE STUDIES  Recent Labs   Lab Test 09/28/21  1806   COLOR Yellow   APPEARANCE Clear   URINEGLC Negative   URINEBILI Negative   URINEKETONE Negative   SG 1.010   UBLD Trace*   URINEPH 6.0   PROTEIN Negative   UROBILINOGEN 0.2   NITRITE Negative   LEUKEST Trace*   RBCU 2-5*   WBCU 0-5     Recent Labs   Lab Test 02/07/22  1542   UTPG 0.09     PTH  Recent Labs   Lab Test 10/03/22  1343 09/28/21  1807   PTHI 21  27 34     IRON STUDIES  Recent Labs   Lab Test 09/28/21  1807   IRON 74      IRONSAT 22   SHANTI 85         Tegan He MD

## 2023-07-31 DIAGNOSIS — N18.31 STAGE 3A CHRONIC KIDNEY DISEASE (CKD) (H): ICD-10-CM

## 2023-07-31 RX ORDER — LOSARTAN POTASSIUM 25 MG/1
25 TABLET ORAL AT BEDTIME
Qty: 90 TABLET | Refills: 3 | Status: SHIPPED | OUTPATIENT
Start: 2023-07-31 | End: 2023-11-12

## 2023-08-12 ENCOUNTER — HEALTH MAINTENANCE LETTER (OUTPATIENT)
Age: 58
End: 2023-08-12

## 2023-11-12 ENCOUNTER — MYC REFILL (OUTPATIENT)
Dept: NEPHROLOGY | Facility: CLINIC | Age: 58
End: 2023-11-12
Payer: COMMERCIAL

## 2023-11-12 DIAGNOSIS — N18.31 STAGE 3A CHRONIC KIDNEY DISEASE (CKD) (H): ICD-10-CM

## 2023-11-13 RX ORDER — LOSARTAN POTASSIUM 25 MG/1
25 TABLET ORAL AT BEDTIME
Qty: 90 TABLET | Refills: 3 | Status: SHIPPED | OUTPATIENT
Start: 2023-11-13

## 2024-01-09 ENCOUNTER — TELEPHONE (OUTPATIENT)
Dept: NEPHROLOGY | Facility: CLINIC | Age: 59
End: 2024-01-09
Payer: COMMERCIAL

## 2024-01-09 NOTE — TELEPHONE ENCOUNTER
FARSHAD and sent My Chart message to schedule follow up with Dr. Ying monroe in basket message // 01.09.2024 MCE

## 2024-07-09 DIAGNOSIS — N18.31 STAGE 3A CHRONIC KIDNEY DISEASE (CKD) (H): Primary | ICD-10-CM

## 2024-07-22 ENCOUNTER — LAB (OUTPATIENT)
Dept: LAB | Facility: CLINIC | Age: 59
End: 2024-07-22
Payer: COMMERCIAL

## 2024-07-22 DIAGNOSIS — N18.31 STAGE 3A CHRONIC KIDNEY DISEASE (CKD) (H): ICD-10-CM

## 2024-07-22 LAB
ALBUMIN SERPL BCG-MCNC: 4.3 G/DL (ref 3.5–5.2)
ANION GAP SERPL CALCULATED.3IONS-SCNC: 9 MMOL/L (ref 7–15)
BUN SERPL-MCNC: 18.4 MG/DL (ref 6–20)
CALCIUM SERPL-MCNC: 9.4 MG/DL (ref 8.8–10.4)
CHLORIDE SERPL-SCNC: 104 MMOL/L (ref 98–107)
CREAT SERPL-MCNC: 1.24 MG/DL (ref 0.51–0.95)
EGFRCR SERPLBLD CKD-EPI 2021: 50 ML/MIN/1.73M2
GLUCOSE SERPL-MCNC: 84 MG/DL (ref 70–99)
HCO3 SERPL-SCNC: 25 MMOL/L (ref 22–29)
HGB BLD-MCNC: 13.8 G/DL (ref 11.7–15.7)
PHOSPHATE SERPL-MCNC: 3 MG/DL (ref 2.5–4.5)
POTASSIUM SERPL-SCNC: 5 MMOL/L (ref 3.4–5.3)
PTH-INTACT SERPL-MCNC: 27 PG/ML (ref 15–65)
SODIUM SERPL-SCNC: 138 MMOL/L (ref 135–145)
VIT D+METAB SERPL-MCNC: 56 NG/ML (ref 20–50)

## 2024-07-22 PROCEDURE — 36415 COLL VENOUS BLD VENIPUNCTURE: CPT

## 2024-07-22 PROCEDURE — 82306 VITAMIN D 25 HYDROXY: CPT

## 2024-07-22 PROCEDURE — 80069 RENAL FUNCTION PANEL: CPT

## 2024-07-22 PROCEDURE — 85018 HEMOGLOBIN: CPT

## 2024-07-22 PROCEDURE — 83970 ASSAY OF PARATHORMONE: CPT

## 2024-07-31 ENCOUNTER — VIRTUAL VISIT (OUTPATIENT)
Dept: NEPHROLOGY | Facility: CLINIC | Age: 59
End: 2024-07-31
Attending: INTERNAL MEDICINE
Payer: COMMERCIAL

## 2024-07-31 VITALS — HEIGHT: 69 IN | WEIGHT: 160 LBS | BODY MASS INDEX: 23.7 KG/M2

## 2024-07-31 DIAGNOSIS — N18.31 STAGE 3A CHRONIC KIDNEY DISEASE (CKD) (H): Primary | ICD-10-CM

## 2024-07-31 PROCEDURE — 99213 OFFICE O/P EST LOW 20 MIN: CPT | Mod: 95 | Performed by: INTERNAL MEDICINE

## 2024-07-31 ASSESSMENT — PAIN SCALES - GENERAL: PAINLEVEL: NO PAIN (0)

## 2024-07-31 NOTE — LETTER
7/31/2024       RE: Whitney Maciel  [address]  [facility] [address]     Dear Colleague,    Thank you for referring your patient, Whitney Maciel, to the [facility]PHROLOG[facility]IC [place] at [facility]. Please see a copy of my visit note below.      07/31/2024     Assessment and Plan:    # CKD 3a:  etiology unclear at this point, but possibly related to history of preeclampsia, PPI,  NSAID.  - had abnormality on ultrasound, follow up MRI showed nothing worrisome (10/12/2021)  - no monoclonal, normal light chain ratio  - had severe cough with lisinopril  - losartan 12.5 mg at bedtime for renal protection - she does not have high blood pressures at home  - creatinine stable at 1.2 mg/dL with eGFR 50 ml/min    # osteoporosis - dx 2017, on estradiol, Calcium  - had follow up DXA 6/13/2023  remains off vitamin D due to having slightly elevated levels in past. Current Vitamin D Level is 56.    # hyperglycemia - A1C was 5.2 Oct 2022, blood glucose 7/22/2024 normal    Return in about 1 year (around 7/31/2025).        Assessment and plan was discussed with patient and she voiced her understanding and agreement.      Tegan He MD  [facility]   Department of Medicine  Division of Renal Disease and Hypertension  [facility]  [employer] Web Console       Reason for Visit:  Whitney Maciel is a 57 year old with CKD from unknown cause, who presents for opinion.    HPI:  Whitney Maciel is a 58 year old  with CKD3 based on elevated creatinine with eGFR 45 since Jan 2019.   Had normal creatinine in 2017.     Renal History:   Kidney Disease and Medical Hx:  No HTN, no diabetes, no herbal supplements  + h/o preeclampsia  + h/o regular NSAID use  Had one episide of bad pyelo with fevers in 2000  Normal kidney function in 2017 and 2008    Last visit April 2023  Nothing new health wise since last visit  Was in [place] for 2 weeks this summer.  Checks BP a  couple times a month - 111-115/70's  Would like to lose weight, work is sedentary during summer but tries to get some walking.    ROS:   A comprehensive review of systems was obtained and negative, except as noted in the HPI or PMH.    Active Medical Problems:  CKD 3b  Osteoporosis  History of preeclampsia    PMH:   Medical record was reviewed and PMH was discussed with patient and noted below.  Osteoporosis    PSH:   No past surgical history on file.    Family Hx:   No kidney disease  Personal Hx:   Social History     Tobacco Use     Smoking status: Never     Smokeless tobacco: Never   Substance Use Topics     Alcohol use: Yes       Allergies:  Allergies   Allergen Reactions     Lisinopril Cough     Chlorhexidine Rash       Medications:  Current Outpatient Medications   Medication Sig Dispense Refill     estradiol-norethindrone (ACTIVELLA) 1-0.5 MG tablet Take 1 tablet by mouth        losartan (COZAAR) 25 MG tablet Take 1 tablet (25 mg) by mouth at bedtime 90 tablet 3     No current facility-administered medications for this visit.      Vitals:  There were no vitals taken for this visit.  Exam:  GENERAL APPEARANCE: alert and no distress  EYES: no scleral icterus, pupils equal  Pulmonary: normal work of breathing  NEURO: mentation intact and speech normal     LABS:   CMP  Recent Labs   Lab Test 07/22/24  0926 04/12/23  1307 10/18/22  1512 10/03/22  1343    140 138 141   POTASSIUM 5.0 4.9 4.2 4.4   CHLORIDE 104 103 105 103   CO2 25 27 30 28   ANIONGAP 9 10 3 10   GLC 84 131* 87 132*   BUN 18.4 16.9 21 14.5   CR 1.24* 1.30* 1.27* 1.25*   GFRESTIMATED 50* 48* 49* 50*   KENYON 9.4 9.7 8.9 10.0     No lab results found.  CBC  Recent Labs   Lab Test 07/22/24  0926 04/12/23  1307 10/03/22  1343 02/07/22  1542 09/28/21  1807   HGB 13.8 13.7 13.4 13.8 13.2   WBC  --   --   --   --  8.9   RBC  --   --   --   --  4.44   HCT  --   --   --   --  40.0   MCV  --   --   --   --  90   MCH  --   --   --   --  29.7   MCHC  --   --    --   --  33.0   RDW  --   --   --   --  12.1   PLT  --   --   --   --  253     URINE STUDIES  Recent Labs   Lab Test 09/28/21  1806   COLOR Yellow   APPEARANCE Clear   URINEGLC Negative   URINEBILI Negative   URINEKETONE Negative   SG 1.010   UBLD Trace*   URINEPH 6.0   PROTEIN Negative   UROBILINOGEN 0.2   NITRITE Negative   LEUKEST Trace*   RBCU 2-5*   WBCU 0-5     Recent Labs   Lab Test 02/07/22  1542   UTPG 0.09     PTH  Recent Labs   Lab Test 07/22/24  0926 10/03/22  1343 09/28/21  1807   PTHI 27 21  27 34     IRON STUDIES  Recent Labs   Lab Test 09/28/21  1807   IRON 74      IRONSAT 22   SHANTI 85     Virtual Visit Details    Type of service:  Video Visit   Joined the call at 7/31/2024, 1:00:06 pm.  Left the call at 7/31/2024, 1:06:07 pm.  Originating Location (pt. Location): Home  Distant Location (provider location):  On-site  Platform used for Video Visit: [facility] Nephrology    Tegan He MD      Again, thank you for allowing me to participate in the care of your patient.      Sincerely,    Tegan He MD

## 2024-07-31 NOTE — PROGRESS NOTES
07/31/2024     Assessment and Plan:    # CKD 3a:  etiology unclear at this point, but possibly related to history of preeclampsia, PPI,  NSAID.  - had abnormality on ultrasound, follow up MRI showed nothing worrisome (10/12/2021)  - no monoclonal, normal light chain ratio  - had severe cough with lisinopril  - losartan 12.5 mg at bedtime for renal protection - she does not have high blood pressures at home  - creatinine stable at 1.2 mg/dL with eGFR 50 ml/min    # osteoporosis - dx 2017, on estradiol, Calcium  - had follow up DXA 6/13/2023  remains off vitamin D due to having slightly elevated levels in past. Current Vitamin D Level is 56.    # hyperglycemia - A1C was 5.2 Oct 2022, blood glucose 7/22/2024 normal    Return in about 1 year (around 7/31/2025).        Assessment and plan was discussed with patient and she voiced her understanding and agreement.      Tegan He MD  [facility]   Department of Medicine  Division of Renal Disease and Hypertension  [facility]  myairmail  Vocera Web Console       Reason for Visit:  Whitney Maciel is a 57 year old with CKD from unknown cause, who presents for opinion.    HPI:  Whitney Maciel is a 58 year old  with CKD3 based on elevated creatinine with eGFR 45 since Jan 2019.   Had normal creatinine in 2017.     Renal History:   Kidney Disease and Medical Hx:  No HTN, no diabetes, no herbal supplements  + h/o preeclampsia  + h/o regular NSAID use  Had one episide of bad pyelo with fevers in 2000  Normal kidney function in 2017 and 2008    Last visit April 2023  Nothing new health wise since last visit  Was in [place] for 2 weeks this summer.  Checks BP a couple times a month - 111-115/70's  Would like to lose weight, work is sedentary during summer but tries to get some walking.    ROS:   A comprehensive review of systems was obtained and negative, except as noted in the HPI or PMH.    Active Medical Problems:  CKD 3b  Osteoporosis  History of  preeclampsia    PMH:   Medical record was reviewed and PMH was discussed with patient and noted below.  Osteoporosis    PSH:   No past surgical history on file.    Family Hx:   No kidney disease  Personal Hx:   Social History     Tobacco Use    Smoking status: Never    Smokeless tobacco: Never   Substance Use Topics    Alcohol use: Yes       Allergies:  Allergies   Allergen Reactions    Lisinopril Cough    Chlorhexidine Rash       Medications:  Current Outpatient Medications   Medication Sig Dispense Refill    estradiol-norethindrone (ACTIVELLA) 1-0.5 MG tablet Take 1 tablet by mouth       losartan (COZAAR) 25 MG tablet Take 1 tablet (25 mg) by mouth at bedtime 90 tablet 3     No current facility-administered medications for this visit.      Vitals:  There were no vitals taken for this visit.  Exam:  GENERAL APPEARANCE: alert and no distress  EYES: no scleral icterus, pupils equal  Pulmonary: normal work of breathing  NEURO: mentation intact and speech normal     LABS:   CMP  Recent Labs   Lab Test 07/22/24  0926 04/12/23  1307 10/18/22  1512 10/03/22  1343    140 138 141   POTASSIUM 5.0 4.9 4.2 4.4   CHLORIDE 104 103 105 103   CO2 25 27 30 28   ANIONGAP 9 10 3 10   GLC 84 131* 87 132*   BUN 18.4 16.9 21 14.5   CR 1.24* 1.30* 1.27* 1.25*   GFRESTIMATED 50* 48* 49* 50*   KENYON 9.4 9.7 8.9 10.0     No lab results found.  CBC  Recent Labs   Lab Test 07/22/24  0926 04/12/23  1307 10/03/22  1343 02/07/22  1542 09/28/21  1807   HGB 13.8 13.7 13.4 13.8 13.2   WBC  --   --   --   --  8.9   RBC  --   --   --   --  4.44   HCT  --   --   --   --  40.0   MCV  --   --   --   --  90   MCH  --   --   --   --  29.7   MCHC  --   --   --   --  33.0   RDW  --   --   --   --  12.1   PLT  --   --   --   --  253     URINE STUDIES  Recent Labs   Lab Test 09/28/21  1806   COLOR Yellow   APPEARANCE Clear   URINEGLC Negative   URINEBILI Negative   URINEKETONE Negative   SG 1.010   UBLD Trace*   URINEPH 6.0   PROTEIN Negative    UROBILINOGEN 0.2   NITRITE Negative   LEUKEST Trace*   RBCU 2-5*   WBCU 0-5     Recent Labs   Lab Test 02/07/22  1542   UTPG 0.09     PTH  Recent Labs   Lab Test 07/22/24  0926 10/03/22  1343 09/28/21  1807   PTHI 27 21  27 34     IRON STUDIES  Recent Labs   Lab Test 09/28/21  1807   IRON 74      IRONSAT 22   SHANTI 85     Virtual Visit Details    Type of service:  Video Visit   Joined the call at 7/31/2024, 1:00:06 pm.  Left the call at 7/31/2024, 1:06:07 pm.  Originating Location (pt. Location): Home  Distant Location (provider location):  On-site  Platform used for Video Visit: [facility] Nephrology    Tegan He MD

## 2024-07-31 NOTE — NURSING NOTE
Current patient location: Work    Is the patient currently in the state [facility]? YES    Visit mode:VIDEO    If the visit is dropped, the patient can be reconnected by: VIDEO VISIT: Send to e-mail at: [email or web address]    Will anyone else be joining the visit? NO  (If patient encounters technical issues they should call [phone]    How would you like to obtain your AVS? MyChart    Are changes needed to the allergy or medication list? No    Are refills needed on medications prescribed by this physician? NO    Rooming Documentation:  Assigned questionnaire(s) completed      Reason for visit: CHI CHIN

## 2024-07-31 NOTE — PATIENT INSTRUCTIONS
As discussed, your kidney function is stable  Continue losartan    Return in about 1 year (around 7/31/2025).

## 2024-09-03 ENCOUNTER — TELEPHONE (OUTPATIENT)
Dept: NEPHROLOGY | Facility: CLINIC | Age: 59
End: 2024-09-03
Payer: COMMERCIAL

## 2024-09-03 NOTE — TELEPHONE ENCOUNTER
Left Voicemail (1st Attempt) for the patient to call back and schedule the following:    Appointment type: [facility]  Provider: [facility]  Return date: 07/31/2025  Specialty phone number: [phone]  Additional appointment(s) needed: LABS  Additonal Notes: N/A

## 2024-10-05 ENCOUNTER — HEALTH MAINTENANCE LETTER (OUTPATIENT)
Age: 59
End: 2024-10-05

## 2025-07-16 ENCOUNTER — DOCUMENTATION ONLY (OUTPATIENT)
Dept: MULTI SPECIALTY CLINIC | Facility: CLINIC | Age: 60
End: 2025-07-16
Payer: COMMERCIAL

## 2025-07-16 NOTE — PROGRESS NOTES
Whitney Maciel has an upcoming lab appointment:    Future Appointments   Date Time Provider Department Center   7/23/2025  8:30 AM LAB FIRST FLOOR [facility] [place]   7/30/2025 12:30 PM Tegan He MD [facility]     Patient is scheduled for the following lab(s): [facility] labs     There is no order available. Please review and place either future orders or HMPO (Review of Health Maintenance Protocol Orders), as appropriate.    Health Maintenance Due   Topic    LIPID     MICROALBUMIN     ANNUAL REVIEW OF HM ORDERS     ALK PHOS     BMP     HEMOGLOBIN      Thank you, Angelica

## 2025-07-23 ENCOUNTER — LAB (OUTPATIENT)
Dept: LAB | Facility: CLINIC | Age: 60
End: 2025-07-23
Attending: INTERNAL MEDICINE
Payer: COMMERCIAL

## 2025-07-23 DIAGNOSIS — N18.31 STAGE 3A CHRONIC KIDNEY DISEASE (CKD) (H): ICD-10-CM

## 2025-07-23 DIAGNOSIS — N18.31 STAGE 3A CHRONIC KIDNEY DISEASE (CKD) (H): Primary | ICD-10-CM

## 2025-07-23 LAB
ALBUMIN MFR UR ELPH: 12.7 MG/DL
ALBUMIN SERPL BCG-MCNC: 4.4 G/DL (ref 3.5–5.2)
ANION GAP SERPL CALCULATED.3IONS-SCNC: 11 MMOL/L (ref 7–15)
BUN SERPL-MCNC: 18.9 MG/DL (ref 8–23)
CALCIUM SERPL-MCNC: 9.2 MG/DL (ref 8.8–10.4)
CHLORIDE SERPL-SCNC: 104 MMOL/L (ref 98–107)
CREAT SERPL-MCNC: 1.3 MG/DL (ref 0.51–0.95)
CREAT UR-MCNC: 218 MG/DL
EGFRCR SERPLBLD CKD-EPI 2021: 47 ML/MIN/1.73M2
GLUCOSE SERPL-MCNC: 94 MG/DL (ref 70–99)
HCO3 SERPL-SCNC: 24 MMOL/L (ref 22–29)
HGB BLD-MCNC: 13.8 G/DL (ref 11.7–15.7)
MCV RBC AUTO: 90 FL (ref 78–100)
PHOSPHATE SERPL-MCNC: 2.2 MG/DL (ref 2.5–4.5)
POTASSIUM SERPL-SCNC: 4.4 MMOL/L (ref 3.4–5.3)
PROT/CREAT 24H UR: 0.06 MG/MG CR (ref 0–0.2)
PTH-INTACT SERPL-MCNC: 44 PG/ML (ref 15–65)
SODIUM SERPL-SCNC: 139 MMOL/L (ref 135–145)
VIT D+METAB SERPL-MCNC: 48 NG/ML (ref 20–50)

## 2025-07-23 PROCEDURE — 80069 RENAL FUNCTION PANEL: CPT

## 2025-07-23 PROCEDURE — 82306 VITAMIN D 25 HYDROXY: CPT

## 2025-07-23 PROCEDURE — 84156 ASSAY OF PROTEIN URINE: CPT

## 2025-07-23 PROCEDURE — 85018 HEMOGLOBIN: CPT

## 2025-07-23 PROCEDURE — 36415 COLL VENOUS BLD VENIPUNCTURE: CPT

## 2025-07-23 PROCEDURE — 83970 ASSAY OF PARATHORMONE: CPT

## 2025-07-24 ASSESSMENT — PAIN SCALES - GENERAL: PAINLEVEL_OUTOF10: NO PAIN (0)

## 2025-07-30 ENCOUNTER — VIRTUAL VISIT (OUTPATIENT)
Dept: NEPHROLOGY | Facility: CLINIC | Age: 60
End: 2025-07-30
Attending: INTERNAL MEDICINE
Payer: COMMERCIAL

## 2025-07-30 VITALS
SYSTOLIC BLOOD PRESSURE: 111 MMHG | DIASTOLIC BLOOD PRESSURE: 78 MMHG | WEIGHT: 161 LBS | BODY MASS INDEX: 23.85 KG/M2 | HEIGHT: 69 IN

## 2025-07-30 DIAGNOSIS — E83.39 HYPOPHOSPHATEMIA: ICD-10-CM

## 2025-07-30 DIAGNOSIS — M81.0 AGE-RELATED OSTEOPOROSIS WITHOUT CURRENT PATHOLOGICAL FRACTURE: ICD-10-CM

## 2025-07-30 DIAGNOSIS — N18.31 STAGE 3A CHRONIC KIDNEY DISEASE (CKD) (H): Primary | ICD-10-CM

## 2025-07-30 RX ORDER — IBUPROFEN 200 MG
1 CAPSULE ORAL DAILY
COMMUNITY
Start: 2025-07-30

## 2025-07-30 NOTE — PROGRESS NOTES
07/30/2025     Assessment and Plan:    # CKD 3a:  etiology unclear at this point, but possibly related to history of preeclampsia, PPI,  NSAID.  - had possible mass on ultrasound 9/2021, follow up MRI showed nothing worrisome (10/12/2021)  - no monoclonal, normal light chain ratio 9/2021  - had severe cough with lisinopril  - losartan 12.5 mg at bedtime for renal protection - she does not have high blood pressures at home  - creatinine stable at 1.3 mg/dL and eGFR 47    # hypophosphatemia - mild at 2.2, was low in 2022 as well but had improved on recheck. Phos tends to run on the low end.  normal vitamin d level and normal PTH level.  unlikely hyperparathyroidism or vitamin D deficiency   Renal losses remains in ddx - UA has not been done since 2021 so would want to assess for glucosuria.   Would also arrange 24 hour urine phos and calcium    # osteoporosis - dx 2017, on estradiol, Calcium 1 pill per day every morning on an empty stomach  - had follow up DXA 6/13/2023  remains off vitamin D due to having slightly elevated levels in past. Current Vitamin D Level is 48.    # hyperglycemia - resolved    Return in about 1 year (around 7/30/2026).        Assessment and plan was discussed with patient and she voiced her understanding and agreement.      Tegan He MD  [facility]   Department of Medicine  Division of Renal Disease and Hypertension  [facility]  [employer] Web Console       Reason for Visit:  Whitney Maciel is a 57 year old with CKD from unknown cause, who presents for opinion.    HPI:  Whitney Maciel is a 59 year old  with CKD3 based on elevated creatinine with eGFR 45 since Jan 2019.     Renal History:   Kidney Disease and Medical Hx:  No HTN, no diabetes, no herbal supplements  + h/o preeclampsia  + h/o regular NSAID use  Had one episide of bad pyelo with fevers in 2000  Normal kidney function in 2017 and 2008    Last visit 7/31/2024  Retired July 1st 2025  Tore meniscus  March 2025, had surgery and doing well now. Did use acetaminophen and ibuprofen but did not require a lot of pain meds.    Home BP has been good. Appetite fine. Trying to be more active with exercising, biking.      ROS:   A comprehensive review of systems was obtained and negative, except as noted in the HPI or PMH.    Active Medical Problems:  CKD 3b  Osteoporosis  History of preeclampsia    PMH:   Medical record was reviewed and PMH was discussed with patient and noted below.  Osteoporosis    PSH:   Knee Meniscus surgery    Family Hx:   No kidney disease  Personal Hx:   Social History     Tobacco Use    Smoking status: Never    Smokeless tobacco: Never   Substance Use Topics    Alcohol use: Yes       Allergies:  Allergies   Allergen Reactions    Lisinopril Cough    Chlorhexidine Rash       Medications:  Current Outpatient Medications   Medication Sig Dispense Refill    estradiol-norethindrone (ACTIVELLA) 1-0.5 MG tablet Take 1 tablet by mouth       losartan (COZAAR) 25 MG tablet Take 1 tablet (25 mg) by mouth at bedtime. 90 tablet 3     No current facility-administered medications for this visit.      Vitals:  There were no vitals taken for this visit.  Exam:  GENERAL APPEARANCE: alert and no distress  EYES: no scleral icterus, pupils equal  Pulmonary: normal work of breathing  NEURO: mentation intact and speech normal     LABS:   CMP  Recent Labs   Lab Test 07/23/25  0852 07/22/24  0926 04/12/23  1307 10/18/22  1512    138 140 138   POTASSIUM 4.4 5.0 4.9 4.2   CHLORIDE 104 104 103 105   CO2 24 25 27 30   ANIONGAP 11 9 10 3   GLC 94 84 131* 87   BUN 18.9 18.4 16.9 21   CR 1.30* 1.24* 1.30* 1.27*   GFRESTIMATED 47* 50* 48* 49*   KENYON 9.2 9.4 9.7 8.9     No lab results found.  CBC  Recent Labs   Lab Test 07/23/25  0852 07/22/24  0926 04/12/23  1307 10/03/22  1343 02/07/22  1542 09/28/21  1807   HGB 13.8 13.8 13.7 13.4   < > 13.2   WBC  --   --   --   --   --  8.9   RBC  --   --   --   --   --  4.44   HCT  --    --   --   --   --  40.0   MCV 90  --   --   --   --  90   MCH  --   --   --   --   --  29.7   MCHC  --   --   --   --   --  33.0   RDW  --   --   --   --   --  12.1   PLT  --   --   --   --   --  253    < > = values in this interval not displayed.     URINE STUDIES  Recent Labs   Lab Test 09/28/21  1806   COLOR Yellow   APPEARANCE Clear   URINEGLC Negative   URINEBILI Negative   URINEKETONE Negative   SG 1.010   UBLD Trace*   URINEPH 6.0   PROTEIN Negative   UROBILINOGEN 0.2   NITRITE Negative   LEUKEST Trace*   RBCU 2-5*   WBCU 0-5     Recent Labs   Lab Test 02/07/22  1542   UTPG 0.09     PTH  Recent Labs   Lab Test 07/23/25  0852 07/22/24  0926 10/03/22  1343   PTHI 44 27 21  27     IRON STUDIES  Recent Labs   Lab Test 09/28/21  1807   IRON 74      IRONSAT 22   SHANTI 85     Virtual Visit Details    Type of service:  Video Visit   Joined the call at 7/30/2025, 12:30:09 pm.  Left the call at 7/30/2025, 12:46:35 pm.  Originating Location (pt. Location): Home  Distant Location (provider location):  Off-site  Platform used for Video Visit: Mary He MD

## 2025-07-30 NOTE — PATIENT INSTRUCTIONS
Please schedule lab visit for 24 hour urine and blood tests. The blood work should be done on the day you complete the 24 hour urine collection. Also I would like you do do a spot urine collection to screen for glucose in the urine.    Tegan He MD

## 2025-07-30 NOTE — NURSING NOTE
Current patient location: [address] [place] [id number]    Is the patient currently in the state of [place]? YES    Visit mode: VIDEO    If the visit is dropped, the patient can be reconnected by:VIDEO VISIT: Send to e-mail at: [email or web address]    Will anyone else be joining the visit? NO  (If patient encounters technical issues they should call [phone]    Are changes needed to the allergy or medication list? No    Are refills needed on medications prescribed by this physician? YES    Rooming Documentation:  Questionnaire(s) completed    Reason for visit: CHI CHIN

## 2025-07-30 NOTE — LETTER
7/30/2025       RE: Whitney Maciel  [address]  [facility] [address]     Dear Colleague,    Thank you for referring your patient, Whitney Maciel, to the [facility] NEPHROLOGY CLINIC [place] [facility]. Please see a copy of my visit note below.        07/30/2025     Assessment and Plan:    # CKD 3a:  etiology unclear at this point, but possibly related to history of preeclampsia, PPI,  NSAID.  - had possible mass on ultrasound 9/2021, follow up MRI showed nothing worrisome (10/12/2021)  - no monoclonal, normal light chain ratio 9/2021  - had severe cough with lisinopril  - losartan 12.5 mg at bedtime for renal protection - she does not have high blood pressures at home  - creatinine stable at 1.3 mg/dL and eGFR 47    # hypophosphatemia - mild at 2.2, was low in 2022 as well but had improved on recheck. Phos tends to run on the low end.  normal vitamin d level and normal PTH level.  unlikely hyperparathyroidism or vitamin D deficiency   Renal losses remains in ddx - UA has not been done since 2021 so would want to assess for glucosuria.   Would also arrange 24 hour urine phos and calcium    # osteoporosis - dx 2017, on estradiol, Calcium 1 pill per day every morning on an empty stomach  - had follow up DXA 6/13/2023  remains off vitamin D due to having slightly elevated levels in past. Current Vitamin D Level is 48.    # hyperglycemia - resolved    Return in about 1 year (around 7/30/2026).        Assessment and plan was discussed with patient and she voiced her understanding and agreement.      Tegan He MD  [facility]   Department of Medicine  Division of Renal Disease and Hypertension  [facility]  [employer] Web Console       Reason for Visit:  Whitney Maciel is a 57 year old with CKD from unknown cause, who presents for opinion.    HPI:  Whitney Maciel is a 59 year old  with CKD3 based on elevated creatinine with eGFR 45 since  Jan 2019.     Renal History:   Kidney Disease and Medical Hx:  No HTN, no diabetes, no herbal supplements  + h/o preeclampsia  + h/o regular NSAID use  Had one episide of bad pyelo with fevers in 2000  Normal kidney function in 2017 and 2008    Last visit 7/31/2024  Retired July 1st 2025  Tore meniscus March 2025, had surgery and doing well now. Did use acetaminophen and ibuprofen but did not require a lot of pain meds.    Home BP has been good. Appetite fine. Trying to be more active with exercising, biking.      ROS:   A comprehensive review of systems was obtained and negative, except as noted in the HPI or PMH.    Active Medical Problems:  CKD 3b  Osteoporosis  History of preeclampsia    PMH:   Medical record was reviewed and PMH was discussed with patient and noted below.  Osteoporosis    PSH:   Knee Meniscus surgery    Family Hx:   No kidney disease  Personal Hx:   Social History     Tobacco Use     Smoking status: Never     Smokeless tobacco: Never   Substance Use Topics     Alcohol use: Yes       Allergies:  Allergies   Allergen Reactions     Lisinopril Cough     Chlorhexidine Rash       Medications:  Current Outpatient Medications   Medication Sig Dispense Refill     estradiol-norethindrone (ACTIVELLA) 1-0.5 MG tablet Take 1 tablet by mouth        losartan (COZAAR) 25 MG tablet Take 1 tablet (25 mg) by mouth at bedtime. 90 tablet 3     No current facility-administered medications for this visit.      Vitals:  There were no vitals taken for this visit.  Exam:  GENERAL APPEARANCE: alert and no distress  EYES: no scleral icterus, pupils equal  Pulmonary: normal work of breathing  NEURO: mentation intact and speech normal     LABS:   CMP  Recent Labs   Lab Test 07/23/25  0852 07/22/24  0926 04/12/23  1307 10/18/22  1512    138 140 138   POTASSIUM 4.4 5.0 4.9 4.2   CHLORIDE 104 104 103 105   CO2 24 25 27 30   ANIONGAP 11 9 10 3   GLC 94 84 131* 87   BUN 18.9 18.4 16.9 21   CR 1.30* 1.24* 1.30* 1.27*    GFRESTIMATED 47* 50* 48* 49*   KENYON 9.2 9.4 9.7 8.9     No lab results found.  CBC  Recent Labs   Lab Test 07/23/25  0852 07/22/24  0926 04/12/23  1307 10/03/22  1343 02/07/22  1542 09/28/21  1807   HGB 13.8 13.8 13.7 13.4   < > 13.2   WBC  --   --   --   --   --  8.9   RBC  --   --   --   --   --  4.44   HCT  --   --   --   --   --  40.0   MCV 90  --   --   --   --  90   MCH  --   --   --   --   --  29.7   MCHC  --   --   --   --   --  33.0   RDW  --   --   --   --   --  12.1   PLT  --   --   --   --   --  253    < > = values in this interval not displayed.     URINE STUDIES  Recent Labs   Lab Test 09/28/21  1806   COLOR Yellow   APPEARANCE Clear   URINEGLC Negative   URINEBILI Negative   URINEKETONE Negative   SG 1.010   UBLD Trace*   URINEPH 6.0   PROTEIN Negative   UROBILINOGEN 0.2   NITRITE Negative   LEUKEST Trace*   RBCU 2-5*   WBCU 0-5     Recent Labs   Lab Test 02/07/22  1542   UTPG 0.09     PTH  Recent Labs   Lab Test 07/23/25  0852 07/22/24  0926 10/03/22  1343   PTHI 44 27 21  27     IRON STUDIES  Recent Labs   Lab Test 09/28/21  1807   IRON 74      IRONSAT 22   SHANTI 85     Virtual Visit Details    Type of service:  Video Visit   Joined the call at 7/30/2025, 12:30:09 pm.  Left the call at 7/30/2025, 12:46:35 pm.  Originating Location (pt. Location): Home  Distant Location (provider location):  Off-site  Platform used for Video Visit: Mary He MD      Again, thank you for allowing me to participate in the care of your patient.      Sincerely,    Tegan He MD

## 2025-08-12 DIAGNOSIS — N18.31 STAGE 3A CHRONIC KIDNEY DISEASE (CKD) (H): ICD-10-CM

## 2025-08-12 RX ORDER — LOSARTAN POTASSIUM 25 MG/1
25 TABLET ORAL AT BEDTIME
Qty: 90 TABLET | Refills: 3 | Status: SHIPPED | OUTPATIENT
Start: 2025-08-12

## 2025-08-20 ENCOUNTER — LAB (OUTPATIENT)
Dept: LAB | Facility: CLINIC | Age: 60
End: 2025-08-20
Payer: COMMERCIAL

## 2025-08-20 DIAGNOSIS — M81.0 AGE-RELATED OSTEOPOROSIS WITHOUT CURRENT PATHOLOGICAL FRACTURE: ICD-10-CM

## 2025-08-20 DIAGNOSIS — N18.31 STAGE 3A CHRONIC KIDNEY DISEASE (CKD) (H): ICD-10-CM

## 2025-08-20 DIAGNOSIS — E83.39 HYPOPHOSPHATEMIA: ICD-10-CM

## 2025-08-20 LAB
ALBUMIN SERPL BCG-MCNC: 4.3 G/DL (ref 3.5–5.2)
ALBUMIN UR-MCNC: NEGATIVE MG/DL
ANION GAP SERPL CALCULATED.3IONS-SCNC: 11 MMOL/L (ref 7–15)
APPEARANCE UR: CLEAR
BACTERIA #/AREA URNS HPF: ABNORMAL /HPF
BILIRUB UR QL STRIP: NEGATIVE
BUN SERPL-MCNC: 17.4 MG/DL (ref 8–23)
CALCIUM 24H UR-MRATE: 0.06 G/SPEC (ref 0.1–0.3)
CALCIUM SERPL-MCNC: 9.6 MG/DL (ref 8.8–10.4)
CALCIUM UR-MCNC: 3.6 MG/DL
CHLORIDE SERPL-SCNC: 103 MMOL/L (ref 98–107)
COLLECT DURATION TIME UR: 24 H
COLOR UR AUTO: YELLOW
CREAT 24H UR-MRATE: 1.04 G/SPEC (ref 0.72–1.51)
CREAT CL 24H UR+SERPL-VRATE: 54 ML/MIN (ref 66–143)
CREAT CL/1.73 SQ M 24H UR+SERPL-ARVRAT: 49 ML/MIN/1.7M2 (ref 100–160)
CREAT SERPL-MCNC: 1.34 MG/DL (ref 0.51–0.95)
CREAT UR-MCNC: 62.9 MG/DL
EGFRCR SERPLBLD CKD-EPI 2021: 45 ML/MIN/1.73M2
GLUCOSE SERPL-MCNC: 102 MG/DL (ref 70–99)
GLUCOSE UR STRIP-MCNC: NEGATIVE MG/DL
HCO3 SERPL-SCNC: 24 MMOL/L (ref 22–29)
HGB UR QL STRIP: ABNORMAL
HYALINE CASTS #/AREA URNS LPF: ABNORMAL /LPF
KETONES UR STRIP-MCNC: ABNORMAL MG/DL
LEUKOCYTE ESTERASE UR QL STRIP: NEGATIVE
NITRATE UR QL: NEGATIVE
PH UR STRIP: 6 [PH] (ref 5–7)
PHOSPHATE 24H UR-MRATE: 0.54 G/SPEC (ref 0.4–1.3)
PHOSPHATE SERPL-MCNC: 2.8 MG/DL (ref 2.5–4.5)
PHOSPHATE UR-MCNC: 32.9 MG/DL (ref 40–136)
POTASSIUM SERPL-SCNC: 4.9 MMOL/L (ref 3.4–5.3)
RBC #/AREA URNS AUTO: ABNORMAL /HPF
SODIUM SERPL-SCNC: 138 MMOL/L (ref 135–145)
SP GR UR STRIP: 1.01 (ref 1–1.03)
SPECIMEN VOL UR: 1650 ML
SQUAMOUS #/AREA URNS AUTO: ABNORMAL /LPF
UROBILINOGEN UR STRIP-ACNC: 0.2 E.U./DL
WBC #/AREA URNS AUTO: ABNORMAL /HPF

## 2025-08-20 PROCEDURE — 82340 ASSAY OF CALCIUM IN URINE: CPT

## 2025-08-20 PROCEDURE — 84105 ASSAY OF URINE PHOSPHORUS: CPT

## 2025-08-20 PROCEDURE — 36415 COLL VENOUS BLD VENIPUNCTURE: CPT

## 2025-08-20 PROCEDURE — 80069 RENAL FUNCTION PANEL: CPT

## 2025-08-20 PROCEDURE — 82575 CREATININE CLEARANCE TEST: CPT

## 2025-08-20 PROCEDURE — 81001 URINALYSIS AUTO W/SCOPE: CPT
